# Patient Record
Sex: MALE | Race: BLACK OR AFRICAN AMERICAN | URBAN - METROPOLITAN AREA
[De-identification: names, ages, dates, MRNs, and addresses within clinical notes are randomized per-mention and may not be internally consistent; named-entity substitution may affect disease eponyms.]

---

## 2017-01-21 ENCOUNTER — ED HISTORICAL/CONVERTED ENCOUNTER (OUTPATIENT)
Dept: OTHER | Age: 67
End: 2017-01-21

## 2017-05-24 ENCOUNTER — OP HISTORICAL/CONVERTED ENCOUNTER (OUTPATIENT)
Dept: OTHER | Age: 67
End: 2017-05-24

## 2017-11-09 ENCOUNTER — OP HISTORICAL/CONVERTED ENCOUNTER (OUTPATIENT)
Dept: OTHER | Age: 67
End: 2017-11-09

## 2018-03-24 LAB
CREATININE, EXTERNAL: 1.28
LDL-C, EXTERNAL: 47
PSA, EXTERNAL: 9.5

## 2018-03-29 ENCOUNTER — OP HISTORICAL/CONVERTED ENCOUNTER (OUTPATIENT)
Dept: OTHER | Age: 68
End: 2018-03-29

## 2018-05-29 ENCOUNTER — OP HISTORICAL/CONVERTED ENCOUNTER (OUTPATIENT)
Dept: OTHER | Age: 68
End: 2018-05-29

## 2018-06-12 ENCOUNTER — OP HISTORICAL/CONVERTED ENCOUNTER (OUTPATIENT)
Dept: OTHER | Age: 68
End: 2018-06-12

## 2018-08-31 ENCOUNTER — OFFICE VISIT (OUTPATIENT)
Dept: ENDOCRINOLOGY | Age: 68
End: 2018-08-31

## 2018-08-31 VITALS
HEART RATE: 83 BPM | SYSTOLIC BLOOD PRESSURE: 151 MMHG | BODY MASS INDEX: 37.19 KG/M2 | TEMPERATURE: 97.9 F | RESPIRATION RATE: 18 BRPM | HEIGHT: 77 IN | OXYGEN SATURATION: 97 % | DIASTOLIC BLOOD PRESSURE: 98 MMHG | WEIGHT: 315 LBS

## 2018-08-31 DIAGNOSIS — I10 ESSENTIAL HYPERTENSION: ICD-10-CM

## 2018-08-31 DIAGNOSIS — E11.65 TYPE 2 DIABETES MELLITUS WITH HYPERGLYCEMIA, UNSPECIFIED WHETHER LONG TERM INSULIN USE (HCC): ICD-10-CM

## 2018-08-31 DIAGNOSIS — E78.2 MIXED HYPERLIPIDEMIA: ICD-10-CM

## 2018-08-31 DIAGNOSIS — C61 PROSTATE CANCER (HCC): ICD-10-CM

## 2018-08-31 DIAGNOSIS — E11.65 TYPE 2 DIABETES MELLITUS WITH HYPERGLYCEMIA, UNSPECIFIED WHETHER LONG TERM INSULIN USE (HCC): Primary | ICD-10-CM

## 2018-08-31 DIAGNOSIS — N18.4 STAGE 4 CHRONIC KIDNEY DISEASE (HCC): ICD-10-CM

## 2018-08-31 LAB
GLUCOSE POC: 307 MG/DL
HBA1C MFR BLD HPLC: 10.6 %

## 2018-08-31 RX ORDER — HYDRALAZINE HYDROCHLORIDE AND ISOSORBIDE DINITRATE 37.5; 2 MG/1; MG/1
TABLET, FILM COATED ORAL
COMMUNITY
Start: 2018-07-16

## 2018-08-31 RX ORDER — ASPIRIN 81 MG/1
TABLET ORAL DAILY
COMMUNITY

## 2018-08-31 RX ORDER — RIVAROXABAN 20 MG/1
TABLET, FILM COATED ORAL
COMMUNITY
Start: 2018-08-08

## 2018-08-31 RX ORDER — SIMVASTATIN 20 MG/1
TABLET, FILM COATED ORAL
COMMUNITY
Start: 2018-08-12 | End: 2019-03-06 | Stop reason: ALTCHOICE

## 2018-08-31 RX ORDER — PEN NEEDLE, DIABETIC 30 GX3/16"
NEEDLE, DISPOSABLE MISCELLANEOUS
Qty: 200 PEN NEEDLE | Refills: 6 | Status: SHIPPED | OUTPATIENT
Start: 2018-08-31 | End: 2019-09-12 | Stop reason: SDUPTHER

## 2018-08-31 RX ORDER — FUROSEMIDE 40 MG/1
TABLET ORAL
COMMUNITY
Start: 2018-06-30

## 2018-08-31 RX ORDER — GABAPENTIN 100 MG/1
CAPSULE ORAL
COMMUNITY
Start: 2018-08-04 | End: 2019-03-06 | Stop reason: DRUGHIGH

## 2018-08-31 RX ORDER — INSULIN GLARGINE 300 U/ML
130 INJECTION, SOLUTION SUBCUTANEOUS
COMMUNITY
Start: 2018-06-25 | End: 2018-08-31 | Stop reason: SDUPTHER

## 2018-08-31 RX ORDER — INSULIN LISPRO 100 [IU]/ML
INJECTION, SOLUTION INTRAVENOUS; SUBCUTANEOUS
Qty: 30 ML | Refills: 6
Start: 2018-08-31 | End: 2018-11-01 | Stop reason: SDUPTHER

## 2018-08-31 RX ORDER — SODIUM BICARBONATE 650 MG/1
TABLET ORAL 4 TIMES DAILY
COMMUNITY

## 2018-08-31 RX ORDER — TELMISARTAN 80 MG/1
TABLET ORAL
COMMUNITY
Start: 2018-08-07

## 2018-08-31 RX ORDER — METOPROLOL SUCCINATE 100 MG/1
TABLET, EXTENDED RELEASE ORAL
COMMUNITY
Start: 2018-08-20

## 2018-08-31 RX ORDER — SITAGLIPTIN 50 MG/1
TABLET, FILM COATED ORAL
COMMUNITY
Start: 2018-08-27

## 2018-08-31 RX ORDER — INSULIN LISPRO 100 [IU]/ML
5 INJECTION, SOLUTION INTRAVENOUS; SUBCUTANEOUS
COMMUNITY
Start: 2018-06-29 | End: 2018-08-31 | Stop reason: SDUPTHER

## 2018-08-31 NOTE — MR AVS SNAPSHOT
[address] [facility] [address] 
[phone] Patient: Thalia Shields MRN: [id number] Visit Information Date & Time Provider Department Dept. Phone Encounter #  
 8/31/2018  1:00 PM Jose Carlos Faustin MD [facility] [phone] [id number] Follow-up Instructions Return in about 2 months (around 10/31/2018). Upcoming Health Maintenance Date Due Hepatitis C Screening 1950 DTaP/Tdap/Td series (1 - Tdap) 10/20/1972 FOBT Q 1 YEAR AGE 50-75 10/20/2001 ZOSTER VACCINE AGE 60> 8/20/2011 GLAUCOMA SCREENING Q2Y 10/20/2016 Pneumococcal 65+ Low/Medium Risk (1 of 2 - PCV13) 10/20/2016 Influenza Age 5 to Adult 8/1/2018 Allergies as of 8/31/2018  Review Complete On: 8/31/2018 By: Jose Carlos Faustin MD  
 No Known Allergies Current Immunizations  Never Reviewed No immunizations on file. Not reviewed this visit You Were Diagnosed With   
  
 Codes Comments Type 2 diabetes mellitus with hyperglycemia, unspecified whether long term insulin use (HCC)    -  Primary ICD-10-CM: E11.65 ICD-9-CM: 250.00 Vitals BP Pulse Temp Resp Height(growth percentile) Weight(growth percentile) (!) 151/98 (BP 1 Location: Right arm, BP Patient Position: Sitting) 83 97.9 °F (36.6 °C) (Oral) 18 6' 5\" (1.956 m) 322 lb (146.1 kg) SpO2 BMI Smoking Status 97% 38.18 kg/m2 Never Smoker BMI and BSA Data Body Mass Index Body Surface Area  
 38.18 kg/m 2 2.82 m 2 Your Updated Medication List  
  
   
This list is accurate as of 8/31/18  1:37 PM.  Always use your most recent med list.  
  
  
  
  
 aspirin delayed-release 81 mg tablet Take  by mouth daily. BIDIL 20-37.5 mg per tablet Generic drug:  isosorbide-hydrALAZINE  
  
 furosemide 40 mg tablet Commonly known as:  LASIX  
  
 gabapentin 100 mg capsule Commonly known as:  NEURONTIN  
 HumaLOG KwikPen Insulin 100 unit/mL kwikpen Generic drug:  insulin lispro 5 Units Before breakfast, lunch, and dinner. JANUVIA 50 mg tablet Generic drug:  SITagliptin  
  
 loratadine 10 mg Cap Take  by mouth.  
  
 metoprolol succinate 100 mg tablet Commonly known as:  TOPROL-XL  
  
 simvastatin 20 mg tablet Commonly known as:  ZOCOR  
  
 sodium bicarbonate 650 mg tablet Take  by mouth four (4) times daily. telmisartan 80 mg tablet Commonly known as:  MICARDIS  
  
 TOUJEO SOLOSTAR U-300 INSULIN 300 unit/mL (1.5 mL) Inpn Generic drug:  insulin glargine 130 Units nightly. XARELTO 20 mg Tab tablet Generic drug:  rivaroxaban We Performed the Following AMB POC GLUCOSE BLOOD, BY GLUCOSE MONITORING DEVICE [20600 CPT(R)] AMB POC HEMOGLOBIN A1C [55678 CPT(R)] Follow-up Instructions Return in about 2 months (around 10/31/2018). Patient Instructions   
-------------------------------------------------------------------------------------------- Refills    -    please call your pharmacy and have them send us a refill request 
 
Results  -  allow up to a week for lab results to be processed and reviewed. Phone calls  -  Allow upto 24 hrs. for non-urgent calls to be retained Prior authorization - It may take up to 2 weeks to process, depending on your insurance Forms  -  FMLA, DMV, patient assistance, etc. will take up to 2 weeks to process Cancellations - please notify the office in advance if you cannot keep your appointment Samples  - will only be dispensed at visits as supply is limited If you are having a medical emergency call 911 
 
-------------------------------------------------------------------------------------------- Do not skip meals Do not eat in between meals Reduce carbs- pasta, rice, potatoes, bread Do not drink juices or sodas Donot eat peanut butter Do not eat sugar free cookies and cakes Do not eat peaches, oranges, pineapples, grapes, and raisins  
 
 
----------------------------------------------------------------------------------------------------------------- Check blood sugars immediately before each meal and at bedtime Take  TOUJEO insulin  100  units  at bed time Take Humalog  insulin 12   units before breakfast, 12  units before lunch and 12 units before dinner. Also, add additional humalog  as follows with meals  If blood sugars are[de-identified] 
 
150-200 mg 3 units 201-250 mg 6 units 251-300 mg 9 units 301-350 mg 12 units 351-400 mg 15 units 401-450 mg 18 units 451-500 mg 21 units Less than 70 mg NO INSULIN Introducing [facility]! [facility] introduces [employer] patient portal. Now you can access parts of your medical record, email your doctor's office, and request medication refills online. 1. In your internet browser, go to [email or web address] 2. Click on the First Time User? Click Here link in the Sign In box. You will see the New Member Sign Up page. 3. Enter your [employer] Access Code exactly as it appears below. You will not need to use this code after youve completed the sign-up process. If you do not sign up before the expiration date, you must request a new code. · [employer] Access Code: [email or web address] Expires: 11/29/2018  1:36 PM 
 
4. Enter the last four digits of your Social Security Number (xxxx) and Date of Birth (mm/dd/yyyy) as indicated and click Submit. You will be taken to the next sign-up page. 5. Create a [employer] ID. This will be your [employer] login ID and cannot be changed, so think of one that is secure and easy to remember. 6. Create a [employer] password. You can change your password at any time. 7. Enter your Password Reset Question and Answer. This can be used at a later time if you forget your password. 8. Enter your e-mail address. You will receive e-mail notification when new information is available in [address] 9. Click Sign Up. You can now view and download portions of your medical record. 10. Click the Download Summary menu link to download a portable copy of your medical information. If you have questions, please visit the Frequently Asked Questions section of the [employer] website. Remember, [employer] is NOT to be used for urgent needs. For medical emergencies, dial 911. Now available from your iPhone and Android! Please provide this summary of care documentation to your next provider. Your primary care clinician is listed as Moshe Ledesma. If you have any questions after today's visit, please call [phone].

## 2018-08-31 NOTE — PROGRESS NOTES
HISTORY OF PRESENT ILLNESS Vero Haddad is a 78 y.o. male. HPI Patient here for initial visit of Type 2 diabetes mellitus . Referred : by self H/o diabetes for many  years Accompanied by  Wife Current A1C is over 10 %  and symptoms/problems include polyuria, polydipsia and visual disturbances Current diabetic medications include intensive insulin injection program.  
HE DOES NOT EAT RIGHT, DOES NOT CHECK PER  WIFE  
 
 
APPEARS DEPRESSED  
YET TO GO FOR RADIATION TREATMENTS FOR PROSTRATE CANCER Current monitoring regimen: home blood tests - rarely Home blood sugar records: trend: fluctuating a lot Any episodes of hypoglycemia? no 
 
Weight trend: stable Prior visit with dietician: no 
Current diet: \"unhealthy\" diet in general 
Current exercise: no regular exercise Known diabetic complications: nephropathy and peripheral neuropathy Cardiovascular risk factors: dyslipidemia, diabetes mellitus, obesity, sedentary life style, male gender, hypertension, stress Eye exam current (within one year): yes ADAN: unknown Past Medical History:  
Diagnosis Date  [facility]fib ([facility]  [facility]  High cholesterol  Hypertension  Neuropathy History reviewed. No pertinent surgical history. Current Outpatient Prescriptions Medication Sig  furosemide (LASIX) 40 mg tablet  gabapentin (NEURONTIN) 100 mg capsule  BIDIL 20-37.5 mg per tablet  simvastatin (ZOCOR) 20 mg tablet  metoprolol succinate (TOPROL-XL) 100 mg tablet  telmisartan (MICARDIS) 80 mg tablet  JANUVIA 50 mg tablet  XARELTO 20 mg tab tablet  HUMALOG KWIKPEN INSULIN 100 unit/mL kwikpen 5 Units Before breakfast, lunch, and dinner.  TOUJEO SOLOSTAR U-300 INSULIN 300 unit/mL (1.5 mL) inpn 130 Units nightly.  aspirin delayed-release 81 mg tablet Take  by mouth daily.  loratadine 10 mg cap Take  by mouth.  sodium bicarbonate 650 mg tablet Take  by mouth four (4) times daily. No current facility-administered medications for this visit. Review of Systems Constitutional: Positive for malaise/fatigue. HENT: Negative. Eyes: Negative for pain and redness. Respiratory: Negative. Cardiovascular: Negative for chest pain, palpitations and leg swelling. Gastrointestinal: Negative. Negative for constipation. Genitourinary: Positive for frequency. Musculoskeletal: Negative for myalgias. Skin: Negative. Neurological: Negative. Endo/Heme/Allergies: Negative. Psychiatric/Behavioral: Negative for depression and memory loss. The patient does not have insomnia. Physical Exam  
Constitutional: He is oriented to person, place, and time. He appears well-developed and well-nourished. HENT:  
Head: Normocephalic. Eyes: Conjunctivae and EOM are normal. Pupils are equal, round, and reactive to light. Neck: Normal range of motion. Neck supple. No JVD present. No tracheal deviation present. No thyromegaly present. Cardiovascular: Normal rate, regular rhythm and normal heart sounds. Pulmonary/Chest: Effort normal and breath sounds normal.  
Abdominal: Soft. Bowel sounds are normal.  
Musculoskeletal: Normal range of motion. Lymphadenopathy:  
  He has no cervical adenopathy. Neurological: He is alert and oriented to person, place, and time. He has normal reflexes. Skin: Skin is warm. Psychiatric:  
APPEARS A BIT DEPRESSED Diabetic foot exam: aug 2018 Left Foot: 
 Visual Exam: normal   Bunion present , flat foot Pulse DP: 2+ (normal) Filament test: ABSENT Right Foot: 
 Visual Exam: normal  bunion present , flat foot Pulse DP: 2+ (normal) Filament test: ABSENT Reviewed info  From pcp ASSESSMENT and PLAN 1. Type 2 DM, poorly controlled : a1c is over 10 % Discussed DM 2 patho-physiology extensively Reviewed the glucose log He appears depressed from the diagnosis of prostrate cancer likely Encouraged him to give his best  
Adjusted basal bolus regimen Patient is advised about checking blood sugars 4 times a day and maintaining log book. The danger of having low blood sugars has been explained with inappropriate use of insulin  Patient voiced understanding and using the printed instructions at home. Hypoglycemia management has been explained to the patient. Asking patient to AVOID HIGH GI FOODS  
 
lab results and schedule of future lab studies reviewed with patient 
specific diabetic recommendations: diabetic diet discussed in detail, written exchange diet given, low cholesterol diet, weight control and daily exercise discussed, home glucose monitoring emphasized, glucose meter dispensed to patient, all medications, side effects and compliance discussed carefully, use and side effects of insulin is taught, foot care discussed and Podiatry visits discussed and annual eye examinations at Ophthalmology discussed 2. Hypoglycemia :  Educated on treating the hypoglycemia. Discussed INSULIN use and prescribed 3. HTN : continue current care. Patient is educated about importance of compliance with anti-hypertensives especially ARB/ACEI 4. Dyslipidemia : continue current meds. Patient is educated about benefits and adverse effects of statins and explained how benefits outweigh risk. 5. use of aspirin to prevent MI and TIA's discussed 6. H/o prostrate cancer - yet to go for radiotherapy 7. H/o afib F/u with Dr. Loren Schmitz 8. Peripheral neuropathy : MILD TO MODERATE SYMPTOMS 9. CKD  -   Creat is  2.12 and stage 3   FROM [facility] 2017, VERY OLD LABS  
F/U WITH DR. Merino Pap 
 
 
 
> 50 % of time is spent on counseling Patient voiced understanding her plan of care

## 2018-08-31 NOTE — PATIENT INSTRUCTIONS
-------------------------------------------------------------------------------------------- Refills    -    please call your pharmacy and have them send us a refill request 
 
Results  -  allow up to a week for lab results to be processed and reviewed. Phone calls  -  Allow upto 24 hrs. for non-urgent calls to be retained Prior authorization - It may take up to 2 weeks to process, depending on your insurance Forms  -  FMLA, DMV, patient assistance, etc. will take up to 2 weeks to process Cancellations - please notify the office in advance if you cannot keep your appointment Samples  - will only be dispensed at visits as supply is limited If you are having a medical emergency call 911 
 
-------------------------------------------------------------------------------------------- Do not skip meals Do not eat in between meals Reduce carbs- pasta, rice, potatoes, bread Do not drink juices or sodas Donot eat peanut butter Do not eat sugar free cookies and cakes Do not eat peaches, oranges, pineapples, grapes, and raisins  
 
 
----------------------------------------------------------------------------------------------------------------- Check blood sugars immediately before each meal and at bedtime Take  TOUJEO insulin  100  units  at bed time Take Humalog  insulin 12   units before breakfast, 12  units before lunch and 12 units before dinner. Also, add additional humalog  as follows with meals  If blood sugars are[de-identified] 
 
150-200 mg 3 units 201-250 mg 6 units 251-300 mg 9 units 301-350 mg 12 units 351-400 mg 15 units 401-450 mg 18 units 451-500 mg 21 units Less than 70 mg NO INSULIN

## 2018-08-31 NOTE — PROGRESS NOTES
1. Have you been to the ER, urgent care clinic since your last visit? No Hospitalized since your last visit? No 
 
2. Have you seen or consulted any other health care providers outside of the [facility] since your last visit? No 
 
Wt Readings from Last 3 Encounters:  
08/31/18 322 lb (146.1 kg) Temp Readings from Last 3 Encounters:  
08/31/18 97.9 °F (36.6 °C) (Oral) BP Readings from Last 3 Encounters:  
08/31/18 (!) 151/98 Pulse Readings from Last 3 Encounters:  
08/31/18 83 Patient does not have meter or logbook today.

## 2018-09-03 ENCOUNTER — DOCUMENTATION ONLY (OUTPATIENT)
Dept: ENDOCRINOLOGY | Age: 68
End: 2018-09-03

## 2018-09-03 NOTE — PROGRESS NOTES
COULD YOU PLEASE OBTAIN RECENT LABS ON THIS PT     LAST ONES WE GOT FROM PCP WERE FROM [facility] 2017     Nadeem Arshad MD

## 2018-09-04 ENCOUNTER — OP HISTORICAL/CONVERTED ENCOUNTER (OUTPATIENT)
Dept: OTHER | Age: 68
End: 2018-09-04

## 2018-09-07 ENCOUNTER — OP HISTORICAL/CONVERTED ENCOUNTER (OUTPATIENT)
Dept: OTHER | Age: 68
End: 2018-09-07

## 2018-09-12 ENCOUNTER — OP HISTORICAL/CONVERTED ENCOUNTER (OUTPATIENT)
Dept: OTHER | Age: 68
End: 2018-09-12

## 2018-09-21 ENCOUNTER — OP HISTORICAL/CONVERTED ENCOUNTER (OUTPATIENT)
Dept: OTHER | Age: 68
End: 2018-09-21

## 2018-10-01 ENCOUNTER — OP HISTORICAL/CONVERTED ENCOUNTER (OUTPATIENT)
Dept: OTHER | Age: 68
End: 2018-10-01

## 2018-11-01 ENCOUNTER — OFFICE VISIT (OUTPATIENT)
Dept: ENDOCRINOLOGY | Age: 68
End: 2018-11-01

## 2018-11-01 ENCOUNTER — OP HISTORICAL/CONVERTED ENCOUNTER (OUTPATIENT)
Dept: OTHER | Age: 68
End: 2018-11-01

## 2018-11-01 VITALS
SYSTOLIC BLOOD PRESSURE: 159 MMHG | DIASTOLIC BLOOD PRESSURE: 85 MMHG | RESPIRATION RATE: 16 BRPM | HEIGHT: 77 IN | OXYGEN SATURATION: 97 % | HEART RATE: 78 BPM | TEMPERATURE: 97.1 F | WEIGHT: 315 LBS | BODY MASS INDEX: 37.19 KG/M2

## 2018-11-01 DIAGNOSIS — C61 PROSTATE CANCER (HCC): ICD-10-CM

## 2018-11-01 DIAGNOSIS — E11.65 TYPE 2 DIABETES MELLITUS WITH HYPERGLYCEMIA, UNSPECIFIED WHETHER LONG TERM INSULIN USE (HCC): ICD-10-CM

## 2018-11-01 DIAGNOSIS — E11.65 TYPE 2 DIABETES MELLITUS WITH HYPERGLYCEMIA, UNSPECIFIED WHETHER LONG TERM INSULIN USE (HCC): Primary | ICD-10-CM

## 2018-11-01 DIAGNOSIS — I10 ESSENTIAL HYPERTENSION: ICD-10-CM

## 2018-11-01 DIAGNOSIS — E78.2 MIXED HYPERLIPIDEMIA: ICD-10-CM

## 2018-11-01 RX ORDER — INSULIN LISPRO 100 [IU]/ML
INJECTION, SOLUTION INTRAVENOUS; SUBCUTANEOUS
Qty: 93 ML | Refills: 6 | Status: SHIPPED | OUTPATIENT
Start: 2018-11-01 | End: 2019-11-22 | Stop reason: SDUPTHER

## 2018-11-01 RX ORDER — INSULIN LISPRO 100 [IU]/ML
INJECTION, SOLUTION INTRAVENOUS; SUBCUTANEOUS
Qty: 30 ML | Refills: 6 | Status: SHIPPED | OUTPATIENT
Start: 2018-11-01 | End: 2018-11-01 | Stop reason: SDUPTHER

## 2018-11-01 NOTE — PATIENT INSTRUCTIONS
-------------------------------------------------------------------------------------------- Refills    -    please call your pharmacy and have them send us a refill request 
 
Results  -  allow up to a week for lab results to be processed and reviewed. Phone calls  -  Allow upto 24 hrs. for non-urgent calls to be retained Prior authorization - It may take up to 4 weeks to process, depending on your insurance Forms  -  FMLA, DMV, patient assistance, etc. will take up to 2 weeks to process Cancellations - please notify the office in advance if you cannot keep your appointment Samples  - will only be dispensed at visits as supply is limited If you are having a medical emergency call 911 
 
-------------------------------------------------------------------------------------------- Do not skip meals Do not eat in between meals Reduce carbs- pasta, rice, potatoes, bread Do not drink juices or sodas Donot eat peanut butter Do not eat sugar free cookies and cakes Do not eat peaches, oranges, pineapples, grapes, and raisins  
 
 
----------------------------------------------------------------------------------------------------------------- 
 
 
STAY ON Rashi Winkler Check blood sugars immediately before each meal and at bedtime INCREASE   TOUJEO   Max   PENS   insulin  110  units  at bed time ( STOP TOUJEO PLAN ) Humalog  insulin 12   units before breakfast, 12  units before lunch and    INCREASE  15  units before dinner. Also, add additional humalog  as follows with meals  If blood sugars are[de-identified] 
 
150-200 mg 3 units 201-250 mg 6 units 251-300 mg 9 units 301-350 mg 12 units 351-400 mg 15 units 401-450 mg 18 units 451-500 mg 21 units Less than 70 mg NO INSULIN

## 2018-11-01 NOTE — PROGRESS NOTES
1. Have you been to the ER, urgent care clinic since your last visit? Hospitalized since your last visit? No 
 
2. Have you seen or consulted any other health care providers outside of the [address] [place] [address] since your last visit? Include any pap smears or colon screening. No  
 
Lab Results Component Value Date/Time Hemoglobin A1c 8.0 (H) 10/25/2018 08:27 AM  
 Hemoglobin A1c (POC) 10.6 08/31/2018 01:02 PM  
 Hemoglobin A1c, External 11.3 03/24/2018 Wt Readings from Last 3 Encounters:  
11/01/18 328 lb (148.8 kg) 08/31/18 322 lb (146.1 kg) Temp Readings from Last 3 Encounters:  
11/01/18 97.1 °F (36.2 °C) (Oral) 08/31/18 97.9 °F (36.6 °C) (Oral) BP Readings from Last 3 Encounters:  
11/01/18 159/85  
08/31/18 (!) 151/98 Pulse Readings from Last 3 Encounters:  
11/01/18 78  
08/31/18 83

## 2018-11-01 NOTE — PROGRESS NOTES
HISTORY OF PRESENT ILLNESS Memo Haider is a 80 y.o. male. Patient here for  FIRST FOLLOW UP AFTER initial visit  For  Type 2 diabetes mellitus  From aug 2018 He finds himself to be doing very well He is using diet correct and insulins correctly Accompanied by wife He got the log OLD HISTORY Referred : by self H/o diabetes for many  years Accompanied by  Wife Current A1C is over 10 %  and symptoms/problems include polyuria, polydipsia and visual disturbances Current diabetic medications include intensive insulin injection program.  
HE DOES NOT EAT RIGHT, DOES NOT CHECK PER  WIFE  
 
 
APPEARS DEPRESSED  
YET TO GO FOR RADIATION TREATMENTS FOR PROSTRATE CANCER Current monitoring regimen: home blood tests - rarely Home blood sugar records: trend: fluctuating a lot Any episodes of hypoglycemia? no 
 
Weight trend: stable Prior visit with dietician: no 
Current diet: \"unhealthy\" diet in general 
Current exercise: no regular exercise Known diabetic complications: nephropathy and peripheral neuropathy Cardiovascular risk factors: dyslipidemia, diabetes mellitus, obesity, sedentary life style, male gender, hypertension, stress Eye exam current (within one year): yes ADAN: unknown Past Medical History:  
Diagnosis Date  [facility]fib ([facility]  [facility]  High cholesterol  Hypertension  Neuropathy History reviewed. No pertinent surgical history. Current Outpatient Medications Medication Sig  furosemide (LASIX) 40 mg tablet  gabapentin (NEURONTIN) 100 mg capsule  BIDIL 20-37.5 mg per tablet  simvastatin (ZOCOR) 20 mg tablet  metoprolol succinate (TOPROL-XL) 100 mg tablet  telmisartan (MICARDIS) 80 mg tablet  JANUVIA 50 mg tablet  XARELTO 20 mg tab tablet  aspirin delayed-release 81 mg tablet Take  by mouth daily.  loratadine 10 mg cap Take  by mouth.  sodium bicarbonate 650 mg tablet Take  by mouth four (4) times daily.  insulin glargine (TOUJEO SOLOSTAR U-300 INSULIN) 300 unit/mL (1.5 mL) inpn Inject 100 units at bedtime  insulin lispro (HUMALOG KWIKPEN INSULIN) 100 unit/mL kwikpen Inject 12 units before breakfast, lunch, and dinner. Plus sliding scale to max 99 units daily.  Insulin Needles, Disposable, (BD ULTRA-FINE SHORT PEN NEEDLE) 31 gauge x 5/16\" ndle Use 4 times daily. Dx code E11.65 No current facility-administered medications for this visit. Review of Systems Constitutional: Positive for malaise/fatigue. HENT: Negative. Eyes: Negative for pain and redness. Respiratory: Negative. Cardiovascular: Negative for chest pain, palpitations and leg swelling. Gastrointestinal: Negative. Negative for constipation. Genitourinary: Positive for frequency. Musculoskeletal: Negative for myalgias. Skin: Negative. Neurological: Negative. Endo/Heme/Allergies: Negative. Psychiatric/Behavioral: Negative for depression and memory loss. The patient does not have insomnia. Physical Exam  
Constitutional: He is oriented to person, place, and time. He appears well-developed and well-nourished. HENT:  
Head: Normocephalic. Eyes: Conjunctivae and EOM are normal. Pupils are equal, round, and reactive to light. Neck: Normal range of motion. Neck supple. No JVD present. No tracheal deviation present. No thyromegaly present. Cardiovascular: Normal rate, regular rhythm and normal heart sounds. Pulmonary/Chest: Effort normal and breath sounds normal.  
Abdominal: Soft. Bowel sounds are normal.  
Musculoskeletal: Normal range of motion. Lymphadenopathy:  
  He has no cervical adenopathy. Neurological: He is alert and oriented to person, place, and time. He has normal reflexes. Skin: Skin is warm. Psychiatric:  
APPEARS A BIT DEPRESSED Diabetic foot exam: aug 2018 Left Foot: Visual Exam: normal   Bunion present , flat foot Pulse DP: 2+ (normal) Filament test: ABSENT Right Foot: 
 Visual Exam: normal  bunion present , flat foot Pulse DP: 2+ (normal) Filament test: ABSENT Lab Results Component Value Date/Time Hemoglobin A1c 8.0 (H) 10/25/2018 08:27 AM  
 Hemoglobin A1c, External 11.3 03/24/2018 Glucose 171 (H) 10/25/2018 08:27 AM  
 Glucose  08/31/2018 01:02 PM  
 Microalb/Creat ratio (ug/mg creat.) 36.2 (H) 10/25/2018 08:27 AM  
 LDL, calculated 36 10/25/2018 08:27 AM  
 Creatinine 1.21 10/25/2018 08:27 AM  
  
Lab Results Component Value Date/Time Cholesterol, total 107 10/25/2018 08:27 AM  
 HDL Cholesterol 32 (L) 10/25/2018 08:27 AM  
 LDL, calculated 36 10/25/2018 08:27 AM  
 LDL-C, External 47 03/24/2018 Triglyceride 196 (H) 10/25/2018 08:27 AM  
 
Lab Results Component Value Date/Time ALT (SGPT) 17 10/25/2018 08:27 AM  
 AST (SGOT) 17 10/25/2018 08:27 AM  
 Alk. phosphatase 88 10/25/2018 08:27 AM  
 Bilirubin, total 0.3 10/25/2018 08:27 AM  
 Albumin 3.8 10/25/2018 08:27 AM  
 Protein, total 7.2 10/25/2018 08:27 AM  
 
Lab Results Component Value Date/Time GFR est non-AA 62 10/25/2018 08:27 AM  
 GFR est AA 71 10/25/2018 08:27 AM  
 Creatinine 1.21 10/25/2018 08:27 AM  
 BUN 16 10/25/2018 08:27 AM  
 Sodium 144 10/25/2018 08:27 AM  
 Potassium 4.2 10/25/2018 08:27 AM  
 Chloride 102 10/25/2018 08:27 AM  
 CO2 29 10/25/2018 08:27 AM  
 
 
 
ASSESSMENT and PLAN 1. Type 2 DM, un controlled : a1c is   8 %      FROM OCT 2018      COMPARED TO  over 10 % IMPROVED CONTROL significantly Reviewed the glucose log He appeared depressed from the diagnosis of prostrate cancer likely  But today he looks brighter WILL CONSIDER METFOMRIN AT LATER TIMES AFTER TREATMENT IS FINISHED  
STAY ON Yarelis Mabry Encouraged him to give his best  
Adjusted basal bolus regimen Patient is advised about checking blood sugars 4 times a day and maintaining log book. The danger of having low blood sugars has been explained with inappropriate use of insulin  Patient voiced understanding and using the printed instructions at home. Hypoglycemia management has been explained to the patient. Asking patient to AVOID HIGH GI FOODS  
lab results and schedule of future lab studies reviewed with patient 2. Hypoglycemia :  Educated on treating the hypoglycemia. Discussed INSULIN use and prescribed 3. HTN : continue current care. Patient is educated about importance of compliance with anti-hypertensives especially ARB/ACEI 4. Dyslipidemia : continue current meds. Patient is educated about benefits and adverse effects of statins and explained how benefits outweigh risk. 5. use of aspirin to prevent MI and TIA's discussed 6. H/o prostrate cancer - started radiotherapy 7. H/o afib F/u with Dr. Alex Mata 8. Peripheral neuropathy : MILD TO MODERATE SYMPTOMS 9. CKD  -  RESOLVED  Creat is  NORMAL ( HE CAM BE ON METFORMIN , BUT NOT STARTING IT BECAUSE OF RADIATION TREATMENTS ) 
 
F/U WITH DR. Bart King 
 
 
 
> 50 % of time is spent on counseling Patient voiced understanding her plan of care

## 2018-12-01 ENCOUNTER — OP HISTORICAL/CONVERTED ENCOUNTER (OUTPATIENT)
Dept: OTHER | Age: 68
End: 2018-12-01

## 2019-02-12 ENCOUNTER — ED HISTORICAL/CONVERTED ENCOUNTER (OUTPATIENT)
Dept: OTHER | Age: 69
End: 2019-02-12

## 2019-03-06 ENCOUNTER — OFFICE VISIT (OUTPATIENT)
Dept: ENDOCRINOLOGY | Age: 69
End: 2019-03-06

## 2019-03-06 VITALS
BODY MASS INDEX: 37.19 KG/M2 | HEIGHT: 77 IN | OXYGEN SATURATION: 99 % | HEART RATE: 67 BPM | SYSTOLIC BLOOD PRESSURE: 129 MMHG | TEMPERATURE: 95.5 F | DIASTOLIC BLOOD PRESSURE: 82 MMHG | RESPIRATION RATE: 20 BRPM | WEIGHT: 315 LBS

## 2019-03-06 DIAGNOSIS — C61 PROSTATE CANCER (HCC): ICD-10-CM

## 2019-03-06 DIAGNOSIS — N18.30 CKD STAGE 3 SECONDARY TO DIABETES (HCC): ICD-10-CM

## 2019-03-06 DIAGNOSIS — E11.22 CKD STAGE 3 SECONDARY TO DIABETES (HCC): ICD-10-CM

## 2019-03-06 DIAGNOSIS — E11.65 TYPE 2 DIABETES MELLITUS WITH HYPERGLYCEMIA, UNSPECIFIED WHETHER LONG TERM INSULIN USE (HCC): Primary | ICD-10-CM

## 2019-03-06 DIAGNOSIS — I10 ESSENTIAL HYPERTENSION: ICD-10-CM

## 2019-03-06 DIAGNOSIS — E11.65 TYPE 2 DIABETES MELLITUS WITH HYPERGLYCEMIA, UNSPECIFIED WHETHER LONG TERM INSULIN USE (HCC): ICD-10-CM

## 2019-03-06 PROBLEM — E66.01 SEVERE OBESITY (HCC): Status: ACTIVE | Noted: 2019-03-06

## 2019-03-06 RX ORDER — TAMSULOSIN HYDROCHLORIDE 0.4 MG/1
CAPSULE ORAL
Refills: 1 | COMMUNITY
Start: 2019-02-11

## 2019-03-06 RX ORDER — GABAPENTIN 300 MG/1
CAPSULE ORAL
Qty: 90 CAP | Refills: 5 | Status: SHIPPED | OUTPATIENT
Start: 2019-03-06 | End: 2019-09-06 | Stop reason: SDUPTHER

## 2019-03-06 NOTE — PATIENT INSTRUCTIONS
--------------------------------------------------------------------------------------------    Refills    -    please call your pharmacy and have them send us a refill request    Results  -  allow up to a week for lab results to be processed and reviewed. Phone calls  -  Allow upto 24 hrs. for non-urgent calls to be retained    Prior authorization - It may take up to 4 weeks to process, depending on your insurance    Forms  -  FMLA, DMV, patient assistance, etc. will take up to 2 weeks to process    Cancellations - please notify the office in advance if you cannot keep your appointment    Samples  - will only be dispensed at visits as supply is limited      If you are having a medical emergency call 911    --------------------------------------------------------------------------------------------      Do not skip meals  Do not eat in between meals    Reduce carbs- pasta, rice, potatoes, bread   Do not drink juices or sodas  Donot eat peanut butter     Do not eat sugar free cookies and cakes   Do not eat peaches, oranges, pineapples, grapes, and raisins       -----------------------------------------------------------------------------------------------------------------    STOP simvastatin     Increase neurontin  300 mg  Bid and increase to    600 mg at night time   ( 300 mg 2 pills ) , stop 100 mg dose       STAY ON JANUVIA       Check blood sugars immediately before each meal and at bedtime        TOUJEO   Max   PENS   insulin  110  units  at bed time ( STOP TOUJEO PLAN )     Humalog  insulin 12   units before breakfast, 12  units before lunch and    INCREASE  15  units before dinner.     Also, add additional humalog  as follows with meals  If blood sugars are[de-identified]    150-200 mg 3 units    201-250 mg 6 units    251-300 mg 9 units    301-350 mg 12 units    351-400 mg 15 units    401-450 mg 18 units    451-500 mg 21 units     Less than 70 mg NO INSULIN

## 2019-03-06 NOTE — PROGRESS NOTES
HISTORY OF PRESENT ILLNESS  Bernardo Caceres is a 80 y.o. male. Patient here for  FOLLOW UP AFTER last  visit  For  Type 2 diabetes mellitus  From November 2018       He developed acute pain on left leg   Went to ER and got onto prednisone and oxycodone   eval was negative     He still has pain in mid shin on left side         Old hsitory     He finds himself to be doing very well   He is using diet correct and insulins correctly     Accompanied by wife   He got the log      OLD HISTORY   Referred : by self    H/o diabetes for many  years     Accompanied by  Wife     Current A1C is over 10 %  and symptoms/problems include polyuria, polydipsia and visual disturbances     Current diabetic medications include intensive insulin injection program.   HE DOES NOT EAT RIGHT, DOES NOT CHECK PER  WIFE       APPEARS DEPRESSED   YET TO GO FOR RADIATION TREATMENTS FOR PROSTRATE CANCER      Current monitoring regimen: home blood tests - rarely  Home blood sugar records: trend: fluctuating a lot  Any episodes of hypoglycemia? no    Weight trend: stable  Prior visit with dietician: no  Current diet: \"unhealthy\" diet in general  Current exercise: no regular exercise    Known diabetic complications: nephropathy and peripheral neuropathy  Cardiovascular risk factors: dyslipidemia, diabetes mellitus, obesity, sedentary life style, male gender, hypertension, stress    Eye exam current (within one year): yes  ADAN: unknown     Past Medical History:   Diagnosis Date    [facility] [address]     Diabetes ([facility] [address]     High cholesterol     Hypertension     Neuropathy      History reviewed. No pertinent surgical history. Current Outpatient Medications   Medication Sig    tamsulosin (FLOMAX) 0.4 mg capsule TK 1 C PO QHS    insulin glargine U-300 conc (TOUJEO MAX U-300 SOLOSTAR) 300 unit/mL (3 mL) inpn 110 Units by SubCUTAneous route nightly.  Stop plain toujeo    insulin lispro (HUMALOG KWIKPEN INSULIN) 100 unit/mL kwikpen INJECT 12 UNITS BEFORE BREAKFAST AND LUNCH, AND 15 UNITS BEFORE DINNER. PLUS SLIDING SCALE TO  UNITS DAILY    furosemide (LASIX) 40 mg tablet     gabapentin (NEURONTIN) 100 mg capsule     BIDIL 20-37.5 mg per tablet     simvastatin (ZOCOR) 20 mg tablet     metoprolol succinate (TOPROL-XL) 100 mg tablet     telmisartan (MICARDIS) 80 mg tablet     JANUVIA 50 mg tablet     XARELTO 20 mg tab tablet     aspirin delayed-release 81 mg tablet Take  by mouth daily.  loratadine 10 mg cap Take  by mouth.  sodium bicarbonate 650 mg tablet Take  by mouth four (4) times daily.  Insulin Needles, Disposable, (BD ULTRA-FINE SHORT PEN NEEDLE) 31 gauge x 5/16\" ndle Use 4 times daily. Dx code E11.65     No current facility-administered medications for this visit. Review of Systems   Constitutional: Positive for malaise/fatigue. HENT: Negative. Eyes: Negative for pain and redness. Respiratory: Negative. Cardiovascular: Negative for chest pain, palpitations and leg swelling. Gastrointestinal: Negative. Negative for constipation. Genitourinary: Positive for frequency. Musculoskeletal: Negative for myalgias. Skin: Negative. Neurological: Negative. Endo/Heme/Allergies: Negative. Psychiatric/Behavioral: Negative for depression and memory loss. The patient does not have insomnia. Physical Exam   Constitutional: He is oriented to person, place, and time. He appears well-developed and well-nourished. HENT:   Head: Normocephalic. Eyes: Conjunctivae and EOM are normal. Pupils are equal, round, and reactive to light. Neck: Normal range of motion. Neck supple. No JVD present. No tracheal deviation present. No thyromegaly present. Cardiovascular: Normal rate, regular rhythm and normal heart sounds. Pulmonary/Chest: Effort normal and breath sounds normal.   Abdominal: Soft. Bowel sounds are normal.   Musculoskeletal: left leg not tender   Lymphadenopathy:     He has no cervical adenopathy. Neurological: He is alert and oriented to person, place, and time. He has normal reflexes. Skin: Skin is warm. Psychiatric: not depressed       Diabetic foot exam: aug 2018     Left Foot:   Visual Exam: normal   Bunion present , flat foot    Pulse DP: 2+ (normal)   Filament test: ABSENT          Right Foot:   Visual Exam: normal  bunion present , flat foot    Pulse DP: 2+ (normal)   Filament test: ABSENT         Lab Results   Component Value Date/Time    Hemoglobin A1c 7.9 (H) 02/26/2019 08:22 AM    Hemoglobin A1c 8.0 (H) 10/25/2018 08:27 AM    Hemoglobin A1c, External 11.3 03/24/2018    Glucose 178 (H) 02/26/2019 08:22 AM    Glucose  08/31/2018 01:02 PM    Microalb/Creat ratio (ug/mg creat.) 24.6 02/26/2019 08:22 AM    LDL, calculated 44 02/26/2019 08:22 AM    Creatinine 1.56 (H) 02/26/2019 08:22 AM      Lab Results   Component Value Date/Time    Cholesterol, total 112 02/26/2019 08:22 AM    HDL Cholesterol 29 (L) 02/26/2019 08:22 AM    LDL, calculated 44 02/26/2019 08:22 AM    LDL-C, External 47 03/24/2018    Triglyceride 194 (H) 02/26/2019 08:22 AM     Lab Results   Component Value Date/Time    ALT (SGPT) 9 02/26/2019 08:22 AM    AST (SGOT) 12 02/26/2019 08:22 AM    Alk. phosphatase 95 02/26/2019 08:22 AM    Bilirubin, total 0.4 02/26/2019 08:22 AM    Albumin 3.7 02/26/2019 08:22 AM    Protein, total 7.4 02/26/2019 08:22 AM     Lab Results   Component Value Date/Time    GFR est non-AA 45 (L) 02/26/2019 08:22 AM    GFR est AA 52 (L) 02/26/2019 08:22 AM    Creatinine 1.56 (H) 02/26/2019 08:22 AM    BUN 21 02/26/2019 08:22 AM    Sodium 146 (H) 02/26/2019 08:22 AM    Potassium 4.4 02/26/2019 08:22 AM    Chloride 100 02/26/2019 08:22 AM    CO2 28 02/26/2019 08:22 AM         ASSESSMENT and PLAN    1.  Type 2 DM, un controlled : a1c is   7.9 %     From   Feb 2019     Compared to   8 %      FROM OCT 2018      COMPARED TO  over 10 %     No change in CONTROL significantly   Reviewed the glucose log   Sugars are high from steroid use for left leg pain     WILL CONSIDER METFOMRIN AT LATER TIMES AFTER TREATMENT IS FINISHED   STAY ON [address] on basal bolus regimen     Patient is advised about checking blood sugars 4 times a day and maintaining log book. The danger of having low blood sugars has been explained with inappropriate use of insulin  Patient voiced understanding and using the printed instructions at home. Hypoglycemia management has been explained to the patient. Asking patient to AVOID HIGH GI FOODS   lab results and schedule of future lab studies reviewed with patient        2. Hypoglycemia :  Educated on treating the hypoglycemia. Discussed INSULIN use and prescribed    3. HTN : continue current care. Patient is educated about importance of compliance with anti-hypertensives especially ARB/ACEI    4. Dyslipidemia : continue current meds. Patient is educated about benefits and adverse effects of statins and explained how benefits outweigh risk. 5. use of aspirin to prevent MI and TIA's discussed      6. H/o prostrate cancer - started radiotherapy       7. Sudden onset pain in left leg 10/10 -  Etiology unclear   He cannot walk per wife   Increasing neurontin to higher doses       7. H/o afib  On xarelto   F/u with Dr. Raysa Cabezas       8. Peripheral neuropathy : MILD TO MODERATE SYMPTOMS       9.  CKD  -  RESOLVED  Creat is  NORMAL ( HE CAn BE ON METFORMIN , BUT NOT STARTING IT BECAUSE OF RADIATION TREATMENTS )  F/U WITH DR. Clif Fragoso        > 50 % of time is spent on counseling   Patient voiced understanding her plan of care

## 2019-03-06 NOTE — PROGRESS NOTES
1. Have you been to the ER, urgent care clinic since your last visit? [facility]/Left leg pain/February 2019  Hospitalized since your last visit? No    2. Have you seen or consulted any other health care providers outside of the [address] [place] [address] since your last visit? Include any pap smears or colon screening.  No     Wt Readings from Last 3 Encounters:   03/06/19 327 lb 9.6 oz (148.6 kg)   11/01/18 328 lb (148.8 kg)   08/31/18 322 lb (146.1 kg)     Temp Readings from Last 3 Encounters:   03/06/19 95.5 °F (35.3 °C) (Oral)   11/01/18 97.1 °F (36.2 °C) (Oral)   08/31/18 97.9 °F (36.6 °C) (Oral)     BP Readings from Last 3 Encounters:   03/06/19 129/82   11/01/18 159/85   08/31/18 (!) 151/98     Pulse Readings from Last 3 Encounters:   03/06/19 67   11/01/18 78   08/31/18 83     Lab Results   Component Value Date/Time    Hemoglobin A1c 7.9 (H) 02/26/2019 08:22 AM    Hemoglobin A1c (POC) 10.6 08/31/2018 01:02 PM    Hemoglobin A1c, External 11.3 03/24/2018     Patient has meter today

## 2019-04-30 DIAGNOSIS — E11.22 CKD STAGE 3 SECONDARY TO DIABETES (HCC): ICD-10-CM

## 2019-04-30 DIAGNOSIS — E11.65 TYPE 2 DIABETES MELLITUS WITH HYPERGLYCEMIA, UNSPECIFIED WHETHER LONG TERM INSULIN USE (HCC): ICD-10-CM

## 2019-04-30 DIAGNOSIS — I10 ESSENTIAL HYPERTENSION: ICD-10-CM

## 2019-04-30 DIAGNOSIS — N18.30 CKD STAGE 3 SECONDARY TO DIABETES (HCC): ICD-10-CM

## 2019-05-01 LAB
ALBUMIN SERPL-MCNC: 3.8 G/DL (ref 3.6–4.8)
ALBUMIN/CREAT UR: 22 MG/G CREAT (ref 0–30)
ALBUMIN/GLOB SERPL: 0.9 {RATIO} (ref 1.2–2.2)
ALP SERPL-CCNC: 120 IU/L (ref 39–117)
ALT SERPL-CCNC: 24 IU/L (ref 0–44)
AST SERPL-CCNC: 25 IU/L (ref 0–40)
BILIRUB SERPL-MCNC: 0.4 MG/DL (ref 0–1.2)
BUN SERPL-MCNC: 39 MG/DL (ref 8–27)
BUN/CREAT SERPL: 22 (ref 10–24)
CALCIUM SERPL-MCNC: 9.8 MG/DL (ref 8.6–10.2)
CHLORIDE SERPL-SCNC: 98 MMOL/L (ref 96–106)
CHOLEST SERPL-MCNC: 149 MG/DL (ref 100–199)
CO2 SERPL-SCNC: 30 MMOL/L (ref 20–29)
CREAT SERPL-MCNC: 1.81 MG/DL (ref 0.76–1.27)
CREAT UR-MCNC: 135.7 MG/DL
EST. AVERAGE GLUCOSE BLD GHB EST-MCNC: 171 MG/DL
GLOBULIN SER CALC-MCNC: 4.2 G/DL (ref 1.5–4.5)
GLUCOSE SERPL-MCNC: 110 MG/DL (ref 65–99)
HBA1C MFR BLD: 7.6 % (ref 4.8–5.6)
HDLC SERPL-MCNC: 27 MG/DL
INTERPRETATION, 910389: NORMAL
INTERPRETATION: NORMAL
LDLC SERPL CALC-MCNC: 92 MG/DL (ref 0–99)
Lab: NORMAL
MICROALBUMIN UR-MCNC: 29.9 UG/ML
PDF IMAGE, 910387: NORMAL
POTASSIUM SERPL-SCNC: 5 MMOL/L (ref 3.5–5.2)
PROT SERPL-MCNC: 8 G/DL (ref 6–8.5)
SODIUM SERPL-SCNC: 144 MMOL/L (ref 134–144)
TRIGL SERPL-MCNC: 151 MG/DL (ref 0–149)
VLDLC SERPL CALC-MCNC: 30 MG/DL (ref 5–40)

## 2019-05-07 ENCOUNTER — OFFICE VISIT (OUTPATIENT)
Dept: ENDOCRINOLOGY | Age: 69
End: 2019-05-07

## 2019-05-07 VITALS
DIASTOLIC BLOOD PRESSURE: 65 MMHG | WEIGHT: 315 LBS | SYSTOLIC BLOOD PRESSURE: 110 MMHG | RESPIRATION RATE: 18 BRPM | TEMPERATURE: 97.1 F | BODY MASS INDEX: 37.19 KG/M2 | OXYGEN SATURATION: 98 % | HEIGHT: 77 IN | HEART RATE: 80 BPM

## 2019-05-07 DIAGNOSIS — E11.22 CKD STAGE 3 SECONDARY TO DIABETES (HCC): ICD-10-CM

## 2019-05-07 DIAGNOSIS — C61 PROSTATE CANCER (HCC): ICD-10-CM

## 2019-05-07 DIAGNOSIS — E11.65 TYPE 2 DIABETES MELLITUS WITH HYPERGLYCEMIA, UNSPECIFIED WHETHER LONG TERM INSULIN USE (HCC): Primary | ICD-10-CM

## 2019-05-07 DIAGNOSIS — M79.605 LEG PAIN, ANTERIOR, LEFT: ICD-10-CM

## 2019-05-07 DIAGNOSIS — I10 ESSENTIAL HYPERTENSION: ICD-10-CM

## 2019-05-07 DIAGNOSIS — N18.30 CKD STAGE 3 SECONDARY TO DIABETES (HCC): ICD-10-CM

## 2019-05-07 RX ORDER — PRAVASTATIN SODIUM 20 MG/1
20 TABLET ORAL
Qty: 30 TAB | Refills: 6 | Status: SHIPPED | OUTPATIENT
Start: 2019-05-07 | End: 2019-12-23

## 2019-05-07 NOTE — PATIENT INSTRUCTIONS
-------------------------------------------------------------------------------------------- Refills    -    please call your pharmacy and have them send us a refill request 
 
Results  -  allow up to a week for lab results to be processed and reviewed. Phone calls  -  Allow upto 24 hrs. for non-urgent calls to be retained Prior authorization - It may take up to 4 weeks to process, depending on your insurance Forms  -  FMLA, DMV, patient assistance, etc. will take up to 2 weeks to process Cancellations - please notify the office in advance if you cannot keep your appointment Samples  - will only be dispensed at visits as supply is limited If you are having a medical emergency call 911 
 
-------------------------------------------------------------------------------------------- Do not skip meals Do not eat in between meals Reduce carbs- pasta, rice, potatoes, bread Do not drink juices or sodas Donot eat peanut butter Do not eat sugar free cookies and cakes Do not eat peaches, oranges, pineapples, grapes, and raisins  
 
 
----------------------------------------------------------------------------------------------------------------- 
 
 neurontin  300 mg  Bid and increase to    600 mg at night time   ( 300 mg 2 pills ) , stop 100 mg dose STAY ON [facility] Check blood sugars immediately before each meal and at bedtime TOUJEO   Max   PENS   insulin  110  units  at bed time ( STOP TOUJEO PLAN ) Humalog  insulin 12   units before breakfast, 12  units before lunch and    INCREASE  15  units before dinner. Also, add additional humalog  as follows with meals  If blood sugars are[de-identified] 
 
150-200 mg 3 units 201-250 mg 6 units 251-300 mg 9 units 301-350 mg 12 units 351-400 mg 15 units 401-450 mg 18 units 451-500 mg 21 units Less than 70 mg NO INSULIN

## 2019-05-07 NOTE — PROGRESS NOTES
HISTORY OF PRESENT ILLNESS Emma Bello is a 80 y.o. male. Patient here for  FOLLOW UP AFTER last  visit  For  Type 2 diabetes mellitus  From march 2019 He continues to have pain in left leg His pcp gave tylenol -codeine Pt says he cannot even walk SUGARS ARE BETTER ON LOG Old history : 
 
 
He developed acute pain on left leg Went to ER and got onto prednisone and oxycodone  
eval was negative He still has pain in mid shin on left side Old hsitory He finds himself to be doing very well He is using diet correct and insulins correctly Accompanied by wife He got the log OLD HISTORY Referred : by self H/o diabetes for many  years Accompanied by  Wife Current A1C is over 10 %  and symptoms/problems include polyuria, polydipsia and visual disturbances Current diabetic medications include intensive insulin injection program.  
HE DOES NOT EAT RIGHT, DOES NOT CHECK PER  WIFE  
 
 
APPEARS DEPRESSED  
YET TO GO FOR RADIATION TREATMENTS FOR PROSTRATE CANCER Current monitoring regimen: home blood tests - rarely Home blood sugar records: trend: fluctuating a lot Any episodes of hypoglycemia? no 
 
Weight trend: stable Prior visit with dietician: no 
Current diet: \"unhealthy\" diet in general 
Current exercise: no regular exercise Known diabetic complications: nephropathy and peripheral neuropathy Cardiovascular risk factors: dyslipidemia, diabetes mellitus, obesity, sedentary life style, male gender, hypertension, stress Eye exam current (within one year): yes ADAN: unknown Past Medical History:  
Diagnosis Date  A-fib ([facility] [address]  Diabetes ([facility] [address]  High cholesterol  Hypertension  Neuropathy History reviewed. No pertinent surgical history. Current Outpatient Medications Medication Sig  tamsulosin (FLOMAX) 0.4 mg capsule TK 1 C PO QHS  gabapentin (NEURONTIN) 300 mg capsule One capsule in the morning, two capsules at night. Stop 100 mg dose  insulin glargine U-300 conc (TOUJEO MAX U-300 SOLOSTAR) 300 unit/mL (3 mL) inpn 110 Units by SubCUTAneous route nightly. Stop plain toujeo  insulin lispro (HUMALOG KWIKPEN INSULIN) 100 unit/mL kwikpen INJECT 12 UNITS BEFORE BREAKFAST AND LUNCH, AND 15 UNITS BEFORE DINNER. PLUS SLIDING SCALE TO  UNITS DAILY  furosemide (LASIX) 40 mg tablet  BIDIL 20-37.5 mg per tablet  metoprolol succinate (TOPROL-XL) 100 mg tablet  telmisartan (MICARDIS) 80 mg tablet  JANUVIA 50 mg tablet  XARELTO 20 mg tab tablet  aspirin delayed-release 81 mg tablet Take  by mouth daily.  loratadine 10 mg cap Take  by mouth.  sodium bicarbonate 650 mg tablet Take  by mouth four (4) times daily.  Insulin Needles, Disposable, (BD ULTRA-FINE SHORT PEN NEEDLE) 31 gauge x 5/16\" ndle Use 4 times daily. Dx code E11.65 No current facility-administered medications for this visit. Review of Systems Constitutional: Positive for malaise/fatigue. HENT: Negative. Eyes: Negative for pain and redness. Respiratory: Negative. Cardiovascular: Negative for chest pain, palpitations and leg swelling. Gastrointestinal: Negative. Negative for constipation. Genitourinary: Positive for frequency. Musculoskeletal: Negative for myalgias. Skin: Negative. Neurological: Negative. Endo/Heme/Allergies: Negative. Psychiatric/Behavioral: Negative for depression and memory loss. The patient does not have insomnia. Physical Exam  
Constitutional: He is oriented to person, place, and time. He appears well-developed and well-nourished. HENT:  
Head: Normocephalic. Eyes: Conjunctivae and EOM are normal. Pupils are equal, round, and reactive to light. Neck: Normal range of motion. Neck supple. No JVD present. No tracheal deviation present. No thyromegaly present. Cardiovascular: Normal rate, regular rhythm and normal heart sounds. Pulmonary/Chest: Effort normal and breath sounds normal.  
Abdominal: Soft. Bowel sounds are normal.  
Musculoskeletal: left leg not tender Lymphadenopathy:  
  He has no cervical adenopathy. Neurological: He is alert and oriented to person, place, and time. He has normal reflexes. Skin: Skin is warm. Psychiatric: not depressed Diabetic foot exam: aug 2018 Left Foot: 
 Visual Exam: normal   Bunion present , flat foot Pulse DP: 2+ (normal) Filament test: ABSENT Right Foot: 
 Visual Exam: normal  bunion present , flat foot Pulse DP: 2+ (normal) Filament test: ABSENT Lab Results Component Value Date/Time Hemoglobin A1c 7.6 (H) 04/30/2019 10:28 AM  
 Hemoglobin A1c 7.9 (H) 02/26/2019 08:22 AM  
 Hemoglobin A1c 8.0 (H) 10/25/2018 08:27 AM  
 Hemoglobin A1c, External 11.3 03/24/2018 Glucose 110 (H) 04/30/2019 10:28 AM  
 Glucose  08/31/2018 01:02 PM  
 Microalb/Creat ratio (ug/mg creat.) 22.0 04/30/2019 10:28 AM  
 LDL, calculated 92 04/30/2019 10:28 AM  
 Creatinine 1.81 (H) 04/30/2019 10:28 AM  
  
Lab Results Component Value Date/Time Cholesterol, total 149 04/30/2019 10:28 AM  
 HDL Cholesterol 27 (L) 04/30/2019 10:28 AM  
 LDL, calculated 92 04/30/2019 10:28 AM  
 LDL-C, External 47 03/24/2018 Triglyceride 151 (H) 04/30/2019 10:28 AM  
 
Lab Results Component Value Date/Time ALT (SGPT) 24 04/30/2019 10:28 AM  
 AST (SGOT) 25 04/30/2019 10:28 AM  
 Alk. phosphatase 120 (H) 04/30/2019 10:28 AM  
 Bilirubin, total 0.4 04/30/2019 10:28 AM  
 Albumin 3.8 04/30/2019 10:28 AM  
 Protein, total 8.0 04/30/2019 10:28 AM  
 
Lab Results Component Value Date/Time  GFR est non-AA 38 (L) 04/30/2019 10:28 AM  
 GFR est AA 44 (L) 04/30/2019 10:28 AM  
 Creatinine 1.81 (H) 04/30/2019 10:28 AM  
 BUN 39 (H) 04/30/2019 10:28 AM  
 Sodium 144 04/30/2019 10:28 AM  
 Potassium 5.0 04/30/2019 10:28 AM  
 Chloride 98 04/30/2019 10:28 AM  
 CO2 30 (H) 04/30/2019 10:28 AM  
 
 
 
ASSESSMENT and PLAN 1. Type 2 DM, un controlled : a1c is  7.6 %       From april 30 2019    Compared to  7.9 %     From   Feb 2019     Compared to   8 %      FROM OCT 2018      COMPARED TO  over 10 % No change in CONTROL significantly Reviewed the glucose log Sugars are high from steroid use for left leg pain WILL CONSIDER METFOMRIN AT LATER TIMES AFTER TREATMENT IS FINISHED  
STAY ON [facility] Continue on basal bolus regimen Patient is advised about checking blood sugars 4 times a day and maintaining log book. The danger of having low blood sugars has been explained with inappropriate use of insulin  Patient voiced understanding and using the printed instructions at home. Hypoglycemia management has been explained to the patient. Asking patient to AVOID HIGH GI FOODS  
lab results and schedule of future lab studies reviewed with patient 2. Hypoglycemia :  Educated on treating the hypoglycemia. Discussed INSULIN use and prescribed 3. HTN : continue telmisartan . Patient is educated about importance of compliance with anti-hypertensives especially ARB/ACEI 4. Dyslipidemia : continue current meds. Patient is educated about benefits and adverse effects of statins and explained how benefits outweigh risk. 5. use of aspirin to prevent MI and TIA's discussed 6. H/o prostrate cancer - started radiotherapy 7. Sudden onset pain in left leg 10/10 -  Etiology unclear He cannot walk per wife AT TIMES Increased neurontin to higher doses LAST VISIT  
HE WAS TOLD THAT THERE IS SPINAL AFFECTION FROM PROSTRATE CANCER  
MRI likely IS NEEDED AND PT IS BEING TOLD TO F/U WITH RADIATION ONC 8. CKD -  Stage 3 ckd   
worser ( lasix 40 mg ) ADJUSTING FOR 20 MG OF LASIX DAILY  
F/U WITH DR. Irina Mcdonald 
 
 
7. H/o afib On xarelto F/u with Dr. Kalyan Yanez 8. Peripheral neuropathy : MILD TO MODERATE SYMPTOMS > 50 % of time is spent on counseling Patient voiced understanding her plan of care

## 2019-05-07 NOTE — PROGRESS NOTES
1. Have you been to the ER, urgent care clinic since your last visit? Hospitalized since your last visit? No 
 
2. Have you seen or consulted any other health care providers outside of the [address] [place] [address] since your last visit? Include any pap smears or colon screening. No 
Chief Complaint Patient presents with  Diabetes  
  followup Abuse Screening Questionnaire 5/7/2019 Do you ever feel afraid of your partner? Severiano Organ Are you in a relationship with someone who physically or mentally threatens you? Severiano Organ Is it safe for you to go home? Darrell Herrmann Learning assessment complete Wt Readings from Last 3 Encounters:  
05/07/19 324 lb (147 kg) 03/06/19 327 lb 9.6 oz (148.6 kg) 11/01/18 328 lb (148.8 kg) Temp Readings from Last 3 Encounters:  
05/07/19 97.1 °F (36.2 °C) (Oral) 03/06/19 95.5 °F (35.3 °C) (Oral) 11/01/18 97.1 °F (36.2 °C) (Oral) BP Readings from Last 3 Encounters:  
05/07/19 110/65  
03/06/19 129/82  
11/01/18 159/85 Pulse Readings from Last 3 Encounters:  
05/07/19 80  
03/06/19 67  
11/01/18 78 Lab Results Component Value Date/Time Hemoglobin A1c 7.6 (H) 04/30/2019 10:28 AM  
 Hemoglobin A1c (POC) 10.6 08/31/2018 01:02 PM  
 Hemoglobin A1c, External 11.3 03/24/2018

## 2019-05-07 NOTE — Clinical Note
5/7/19 Patient: Hortencia Marie YOB: 1950 Date of Visit: 5/7/2019 Angelica Bautista MD 
[address] [address] VIA Facsimile:  
  
 
Dear Angelica Bautista MD, Thank you for referring Mr. Hortencia Marie to [address] [place] [address] for evaluation. My notes for this consultation are attached. If you have questions, please do not hesitate to call me. I look forward to following your patient along with you. Sincerely, Nick Mendez MD

## 2019-05-18 ENCOUNTER — ED HISTORICAL/CONVERTED ENCOUNTER (OUTPATIENT)
Dept: OTHER | Age: 69
End: 2019-05-18

## 2019-05-28 ENCOUNTER — OP HISTORICAL/CONVERTED ENCOUNTER (OUTPATIENT)
Dept: OTHER | Age: 69
End: 2019-05-28

## 2019-06-19 ENCOUNTER — OP HISTORICAL/CONVERTED ENCOUNTER (OUTPATIENT)
Dept: OTHER | Age: 69
End: 2019-06-19

## 2019-08-13 ENCOUNTER — OFFICE VISIT (OUTPATIENT)
Dept: ENDOCRINOLOGY | Age: 69
End: 2019-08-13

## 2019-08-13 VITALS
HEIGHT: 77 IN | TEMPERATURE: 98.1 F | BODY MASS INDEX: 37.19 KG/M2 | WEIGHT: 315 LBS | DIASTOLIC BLOOD PRESSURE: 76 MMHG | RESPIRATION RATE: 18 BRPM | SYSTOLIC BLOOD PRESSURE: 116 MMHG | OXYGEN SATURATION: 97 % | HEART RATE: 68 BPM

## 2019-08-13 DIAGNOSIS — C61 PROSTATE CANCER (HCC): ICD-10-CM

## 2019-08-13 DIAGNOSIS — E11.22 CKD STAGE 3 SECONDARY TO DIABETES (HCC): ICD-10-CM

## 2019-08-13 DIAGNOSIS — E11.65 TYPE 2 DIABETES MELLITUS WITH HYPERGLYCEMIA, UNSPECIFIED WHETHER LONG TERM INSULIN USE (HCC): Primary | ICD-10-CM

## 2019-08-13 DIAGNOSIS — I10 ESSENTIAL HYPERTENSION: ICD-10-CM

## 2019-08-13 DIAGNOSIS — N18.30 CKD STAGE 3 SECONDARY TO DIABETES (HCC): ICD-10-CM

## 2019-08-13 RX ORDER — ABIRATERONE ACETATE 500 MG/1
TABLET, FILM COATED ORAL
COMMUNITY
Start: 2019-08-08

## 2019-08-13 RX ORDER — PREDNISONE 5 MG/1
TABLET ORAL
COMMUNITY
Start: 2019-08-08

## 2019-08-13 RX ORDER — OXYCODONE AND ACETAMINOPHEN 5; 325 MG/1; MG/1
TABLET ORAL
Refills: 0 | COMMUNITY
Start: 2019-06-15

## 2019-08-13 NOTE — PATIENT INSTRUCTIONS
-------------------------------------------------------------------------------------------- Refills    -    please call your pharmacy and have them send us a refill request 
 
Results  -  allow up to a week for lab results to be processed and reviewed. Phone calls  -  Allow upto 24 hrs. for non-urgent calls to be retained Prior authorization - It may take up to 4 weeks to process, depending on your insurance Forms  -  FMLA, DMV, patient assistance, etc. will take up to 2 weeks to process Cancellations - please notify the office in advance if you cannot keep your appointment Samples  - will only be dispensed at visits as supply is limited If you are having a medical emergency call 911 
 
-------------------------------------------------------------------------------------------- Do not skip meals Do not eat in between meals Reduce carbs- pasta, rice, potatoes, bread Do not drink juices or sodas Donot eat peanut butter Do not eat sugar free cookies and cakes Do not eat peaches, oranges, pineapples, grapes, and raisins  
 
 
----------------------------------------------------------------------------------------------------------------- 
 
 neurontin  300 mg  In AM  and increase to    600 mg at night time   ( 300 mg 2 pills ) STAY ON [facility] Check blood sugars immediately before each meal and at bedtime TOUJEO   Max   PENS   insulin  110  units  at bed time ( STOP TOUJEO PLAN ) Humalog  insulin 12   units before breakfast, 12  units before lunch and    INCREASE  15  units before dinner. Also, add additional humalog  as follows with meals  If blood sugars are[de-identified] 
 
150-200 mg 3 units 201-250 mg 6 units 251-300 mg 9 units 301-350 mg 12 units 351-400 mg 15 units 401-450 mg 18 units 451-500 mg 21 units Less than 70 mg NO INSULIN

## 2019-08-13 NOTE — LETTER
8/14/19 Patient: Judit Mederos YOB: 1950 Date of Visit: 8/13/2019 Linh Garza MD 
[address] [address] VIA Facsimile: [phone] Dear Linh Garza MD, Thank you for referring Mr. Judit Medreos to [address] [place] [address] for evaluation. My notes for this consultation are attached. If you have questions, please do not hesitate to call me. I look forward to following your patient along with you. Sincerely, Manas He MD

## 2019-08-13 NOTE — PROGRESS NOTES
HISTORY OF PRESENT ILLNESS  Clara Healy is a 80 y.o. male.   Patient here for  FOLLOW UP AFTER last  visit  For  Type 2 diabetes mellitus  From May   2019       Lost 6 lbs   He had MRI and bone scan which showed prostrate cancer spread to bones explaining for severe pain in his legs   He is now back onto chemo therapy  Pain is bearable   No meter for review            Old history      He continues to have pain in left leg   His pcp gave tylenol -codeine   Pt says he cannot even walk     SUGARS ARE BETTER ON LOG           Old history :      He developed acute pain on left leg   Went to ER and got onto prednisone and oxycodone   eval was negative     He still has pain in mid shin on left side         Old hsitory     He finds himself to be doing very well   He is using diet correct and insulins correctly     Accompanied by wife   He got the log      OLD HISTORY   Referred : by self    H/o diabetes for many  years     Accompanied by  Wife     Current A1C is over 10 %  and symptoms/problems include polyuria, polydipsia and visual disturbances     Current diabetic medications include intensive insulin injection program.   HE DOES NOT EAT RIGHT, DOES NOT CHECK PER  WIFE       APPEARS DEPRESSED   YET TO GO FOR RADIATION TREATMENTS FOR PROSTRATE CANCER      Current monitoring regimen: home blood tests - rarely  Home blood sugar records: trend: fluctuating a lot  Any episodes of hypoglycemia? no    Weight trend: stable  Prior visit with dietician: no  Current diet: \"unhealthy\" diet in general  Current exercise: no regular exercise    Known diabetic complications: nephropathy and peripheral neuropathy  Cardiovascular risk factors: dyslipidemia, diabetes mellitus, obesity, sedentary life style, male gender, hypertension, stress    Eye exam current (within one year): yes  ADAN: unknown     Past Medical History:   Diagnosis Date    [facility] ([facility]     Diabetes ([facility]     High cholesterol     Hypertension     Neuropathy      History reviewed. No pertinent surgical history. Current Outpatient Medications   Medication Sig    ZYTIGA 500 mg tab     predniSONE (DELTASONE) 5 mg tablet     oxyCODONE-acetaminophen (PERCOCET) 5-325 mg per tablet TK 1 TO 2 TS PO Q 4 TO 6 H PRN    pravastatin (PRAVACHOL) 20 mg tablet Take 1 Tab by mouth nightly.  tamsulosin (FLOMAX) 0.4 mg capsule TK 1 C PO QHS    gabapentin (NEURONTIN) 300 mg capsule One capsule in the morning, two capsules at night. Stop 100 mg dose    insulin glargine U-300 conc (TOUJEO MAX U-300 SOLOSTAR) 300 unit/mL (3 mL) inpn 110 Units by SubCUTAneous route nightly. Stop plain toujeo    insulin lispro (HUMALOG KWIKPEN INSULIN) 100 unit/mL kwikpen INJECT 12 UNITS BEFORE BREAKFAST AND LUNCH, AND 15 UNITS BEFORE DINNER. PLUS SLIDING SCALE TO  UNITS DAILY    furosemide (LASIX) 40 mg tablet     BIDIL 20-37.5 mg per tablet     metoprolol succinate (TOPROL-XL) 100 mg tablet     telmisartan (MICARDIS) 80 mg tablet     JANUVIA 50 mg tablet     XARELTO 20 mg tab tablet     aspirin delayed-release 81 mg tablet Take  by mouth daily.  loratadine 10 mg cap Take  by mouth.  sodium bicarbonate 650 mg tablet Take  by mouth four (4) times daily.  Insulin Needles, Disposable, (BD ULTRA-FINE SHORT PEN NEEDLE) 31 gauge x 5/16\" ndle Use 4 times daily. Dx code E11.65     No current facility-administered medications for this visit. Review of Systems   Constitutional: Positive for malaise/fatigue. HENT: Negative. Eyes: Negative for pain and redness. Respiratory: Negative. Cardiovascular: Negative for chest pain, palpitations and leg swelling. Gastrointestinal: Negative. Negative for constipation. Genitourinary: Positive for frequency. Musculoskeletal: Negative for myalgias. Skin: Negative. Neurological: Negative. Endo/Heme/Allergies: Negative. Psychiatric/Behavioral: Negative for depression and memory loss. The patient does not have insomnia. Physical Exam   Constitutional: He is oriented to person, place, and time. He appears well-developed and well-nourished. HENT:   Head: Normocephalic. Eyes: Conjunctivae and EOM are normal. Pupils are equal, round, and reactive to light. Neck: Normal range of motion. Neck supple. No JVD present. No tracheal deviation present. No thyromegaly present. Cardiovascular: Normal rate, regular rhythm and normal heart sounds. Pulmonary/Chest: Effort normal and breath sounds normal.   Abdominal: Soft. Bowel sounds are normal.   Musculoskeletal: left leg not tender   Lymphadenopathy:     He has no cervical adenopathy. Neurological: He is alert and oriented to person, place, and time. He has normal reflexes. Skin: Skin is warm. Psychiatric: not depressed       Diabetic foot exam: aug 2018     Left Foot:   Visual Exam: normal   Bunion present , flat foot    Pulse DP: 2+ (normal)   Filament test: ABSENT          Right Foot:   Visual Exam: normal  bunion present , flat foot    Pulse DP: 2+ (normal)   Filament test: ABSENT         Lab Results   Component Value Date/Time    Hemoglobin A1c 6.9 (H) 08/06/2019 01:16 PM    Hemoglobin A1c 7.6 (H) 04/30/2019 10:28 AM    Hemoglobin A1c 7.9 (H) 02/26/2019 08:22 AM    Hemoglobin A1c, External 11.3 03/24/2018    Glucose 82 08/06/2019 01:16 PM    Glucose  08/31/2018 01:02 PM    Microalb/Creat ratio (ug/mg creat.) 11.3 08/06/2019 01:16 PM    LDL, calculated 68 08/06/2019 01:16 PM    Creatinine 1.88 (H) 08/06/2019 01:16 PM      Lab Results   Component Value Date/Time    Cholesterol, total 132 08/06/2019 01:16 PM    HDL Cholesterol 46 08/06/2019 01:16 PM    LDL, calculated 68 08/06/2019 01:16 PM    LDL-C, External 47 03/24/2018    Triglyceride 91 08/06/2019 01:16 PM     Lab Results   Component Value Date/Time    ALT (SGPT) 14 08/06/2019 01:16 PM    AST (SGOT) 15 08/06/2019 01:16 PM    Alk.  phosphatase 165 (H) 08/06/2019 01:16 PM    Bilirubin, total 0.4 08/06/2019 01:16 PM    Albumin 4.0 08/06/2019 01:16 PM    Protein, total 7.4 08/06/2019 01:16 PM     Lab Results   Component Value Date/Time    GFR est non-AA 36 (L) 08/06/2019 01:16 PM    GFR est AA 42 (L) 08/06/2019 01:16 PM    Creatinine 1.88 (H) 08/06/2019 01:16 PM    BUN 43 (H) 08/06/2019 01:16 PM    Sodium 147 (H) 08/06/2019 01:16 PM    Potassium 4.7 08/06/2019 01:16 PM    Chloride 99 08/06/2019 01:16 PM    CO2 33 (H) 08/06/2019 01:16 PM         ASSESSMENT and PLAN    1. Type 2 DM, un controlled : a1c is  6.9 %    From august 2019    Compared to   7.6 %       From april 30 2019    Compared to  7.9 %     From   Feb 2019     Compared to   8 %      FROM OCT 2018      COMPARED TO  over 10 %         Improved CONTROL significantly   Did not review  the glucose log     Continue on basal bolus regimen     Patient is advised about checking blood sugars 4 times a day and maintaining log book. The danger of having low blood sugars has been explained with inappropriate use of insulin  Patient voiced understanding and using the printed instructions at home. Hypoglycemia management has been explained to the patient. Asking patient to AVOID HIGH GI FOODS   lab results and schedule of future lab studies reviewed with patient        2. Hypoglycemia :  Educated on treating the hypoglycemia. Discussed INSULIN use and prescribed    3. HTN : continue telmisartan . Patient is educated about importance of compliance with anti-hypertensives especially ARB/ACEI    4. Dyslipidemia : continue current meds. Patient is educated about benefits and adverse effects of statins and explained how benefits outweigh risk. 5. use of aspirin to prevent MI and TIA's discussed    6. 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  CKD -  Stage 3 ckd    worser ( lasix 40 mg )  ADJUSTING FOR 20 MG OF LASIX DAILY   F/U WITH DR. Laci Saxena      7. H/o afib  On jess   F/u with Dr. Bhaskar Salinas       > 50 % of time is spent on counseling   Patient voiced understanding her plan of care

## 2019-08-13 NOTE — PROGRESS NOTES
1. Have you been to the ER, urgent care clinic since your last visit? Hospitalized since your last visit? No    2. Have you seen or consulted any other health care providers outside of the [address] [place] [address] since your last visit? Include any pap smears or colon screening. No     Chief Complaint   Patient presents with    Diabetes     follow up(no meter)     Learning assessment complete  Abuse Screening Questionnaire 8/13/2019   Do you ever feel afraid of your partner? N   Are you in a relationship with someone who physically or mentally threatens you? N   Is it safe for you to go home? Y       Medication reviewed with patient to the best of his ability and nurse document \"not taking\" and/or \"taking\" based on patients response.   Lab Results   Component Value Date/Time    Hemoglobin A1c 6.9 (H) 08/06/2019 01:16 PM    Hemoglobin A1c (POC) 10.6 08/31/2018 01:02 PM    Hemoglobin A1c, External 11.3 03/24/2018     Wt Readings from Last 3 Encounters:   08/13/19 316 lb 8 oz (143.6 kg)   05/07/19 324 lb (147 kg)   03/06/19 327 lb 9.6 oz (148.6 kg)     Temp Readings from Last 3 Encounters:   08/13/19 98.1 °F (36.7 °C) (Oral)   05/07/19 97.1 °F (36.2 °C) (Oral)   03/06/19 95.5 °F (35.3 °C) (Oral)     BP Readings from Last 3 Encounters:   08/13/19 116/76   05/07/19 110/65   03/06/19 129/82     Pulse Readings from Last 3 Encounters:   08/13/19 68   05/07/19 80   03/06/19 67

## 2019-09-06 DIAGNOSIS — G62.9 NEUROPATHY: ICD-10-CM

## 2019-09-06 DIAGNOSIS — E11.65 TYPE 2 DIABETES MELLITUS WITH HYPERGLYCEMIA, UNSPECIFIED WHETHER LONG TERM INSULIN USE (HCC): Primary | ICD-10-CM

## 2019-09-08 RX ORDER — GABAPENTIN 300 MG/1
CAPSULE ORAL
Qty: 90 CAP | Refills: 0 | Status: SHIPPED | OUTPATIENT
Start: 2019-09-08 | End: 2019-10-16 | Stop reason: SDUPTHER

## 2019-09-12 RX ORDER — PEN NEEDLE, DIABETIC 30 GX3/16"
NEEDLE, DISPOSABLE MISCELLANEOUS
Qty: 200 PACKAGE | Refills: 4 | Status: SHIPPED | OUTPATIENT
Start: 2019-09-12 | End: 2019-10-25 | Stop reason: SDUPTHER

## 2019-10-11 ENCOUNTER — ED HISTORICAL/CONVERTED ENCOUNTER (OUTPATIENT)
Dept: OTHER | Age: 69
End: 2019-10-11

## 2019-10-16 DIAGNOSIS — G62.9 NEUROPATHY: ICD-10-CM

## 2019-10-16 DIAGNOSIS — E11.65 TYPE 2 DIABETES MELLITUS WITH HYPERGLYCEMIA, UNSPECIFIED WHETHER LONG TERM INSULIN USE (HCC): ICD-10-CM

## 2019-10-16 RX ORDER — GABAPENTIN 300 MG/1
CAPSULE ORAL
Qty: 90 CAP | Refills: 4 | Status: SHIPPED | OUTPATIENT
Start: 2019-10-16 | End: 2020-01-08

## 2019-10-25 RX ORDER — PEN NEEDLE, DIABETIC 30 GX3/16"
NEEDLE, DISPOSABLE MISCELLANEOUS
Qty: 200 PACKAGE | Refills: 4 | Status: SHIPPED | OUTPATIENT
Start: 2019-10-25 | End: 2020-06-29

## 2019-10-25 NOTE — TELEPHONE ENCOUNTER
PCP: Chichi Barrera MD    Last appt: 8/13/2019  Future Appointments   Date Time Provider Yen Rosario   1/2/2020  9:15 AM CDE NURSE CDE [facility]   1/8/2020  9:45 AM Nupur Branch MD CDE [facility]       Requested Prescriptions     Pending Prescriptions Disp Refills    Insulin Needles, Disposable, (BD ULTRA-FINE SHORT PEN NEEDLE) 31 gauge x 5/16\" ndle 200 Package 4     Sig: USE AS DIRECTED FOUR TIMES DAILY     Signed Prescriptions Disp Refills    gabapentin (NEURONTIN) 300 mg capsule 90 Cap 4     Sig: TAKE ONE CAPSULE BY MOUTH EVERY MORNING AND 2 CAPSULES EVERY NIGHT     Authorizing Provider: Rm Lira

## 2019-11-22 DIAGNOSIS — E11.65 TYPE 2 DIABETES MELLITUS WITH HYPERGLYCEMIA, UNSPECIFIED WHETHER LONG TERM INSULIN USE (HCC): ICD-10-CM

## 2019-11-22 RX ORDER — INSULIN LISPRO 100 [IU]/ML
INJECTION, SOLUTION INTRAVENOUS; SUBCUTANEOUS
Qty: 90 ML | Refills: 0 | Status: SHIPPED | OUTPATIENT
Start: 2019-11-22 | End: 2020-01-08 | Stop reason: SDUPTHER

## 2019-12-19 ENCOUNTER — OP HISTORICAL/CONVERTED ENCOUNTER (OUTPATIENT)
Dept: OTHER | Age: 69
End: 2019-12-19

## 2019-12-23 RX ORDER — PRAVASTATIN SODIUM 20 MG/1
TABLET ORAL
Qty: 30 TAB | Refills: 6 | Status: SHIPPED | OUTPATIENT
Start: 2019-12-23

## 2020-01-02 ENCOUNTER — HOSPITAL ENCOUNTER (OUTPATIENT)
Dept: LAB | Age: 70
Discharge: HOME | End: 2020-01-02

## 2020-01-02 ENCOUNTER — LAB ONLY (OUTPATIENT)
Dept: ENDOCRINOLOGY | Age: 70
End: 2020-01-02

## 2020-01-02 DIAGNOSIS — E11.65 TYPE 2 DIABETES MELLITUS WITH HYPERGLYCEMIA, UNSPECIFIED WHETHER LONG TERM INSULIN USE (HCC): ICD-10-CM

## 2020-01-02 DIAGNOSIS — E11.65 TYPE 2 DIABETES MELLITUS WITH HYPERGLYCEMIA, UNSPECIFIED WHETHER LONG TERM INSULIN USE (HCC): Primary | ICD-10-CM

## 2020-01-02 LAB
ALBUMIN SERPL-MCNC: 2.9 G/DL (ref 3.5–5)
ALBUMIN/GLOB SERPL: 0.6 {RATIO} (ref 1.1–2.2)
ALP SERPL-CCNC: 95 U/L (ref 45–117)
ALT SERPL-CCNC: 18 U/L (ref 12–78)
ANION GAP SERPL CALC-SCNC: 4 MMOL/L (ref 5–15)
AST SERPL-CCNC: 16 U/L (ref 15–37)
BILIRUB SERPL-MCNC: 0.5 MG/DL (ref 0.2–1)
BUN SERPL-MCNC: 20 MG/DL (ref 6–20)
BUN/CREAT SERPL: 15 (ref 12–20)
CALCIUM SERPL-MCNC: 8.3 MG/DL (ref 8.5–10.1)
CHLORIDE SERPL-SCNC: 107 MMOL/L (ref 97–108)
CHOLEST SERPL-MCNC: 125 MG/DL
CO2 SERPL-SCNC: 33 MMOL/L (ref 21–32)
CREAT SERPL-MCNC: 1.36 MG/DL (ref 0.7–1.3)
CREAT UR-MCNC: 192 MG/DL
EST. AVERAGE GLUCOSE BLD GHB EST-MCNC: 163 MG/DL
GLOBULIN SER CALC-MCNC: 4.5 G/DL (ref 2–4)
GLUCOSE SERPL-MCNC: 90 MG/DL (ref 65–100)
HBA1C MFR BLD: 7.3 % (ref 4–5.6)
HDLC SERPL-MCNC: 49 MG/DL
HDLC SERPL: 2.6 {RATIO} (ref 0–5)
LDLC SERPL CALC-MCNC: 54.4 MG/DL (ref 0–100)
LIPID PROFILE,FLP: NORMAL
MICROALBUMIN UR-MCNC: 5.69 MG/DL
MICROALBUMIN/CREAT UR-RTO: 30 MG/G (ref 0–30)
POTASSIUM SERPL-SCNC: 3.8 MMOL/L (ref 3.5–5.1)
PROT SERPL-MCNC: 7.4 G/DL (ref 6.4–8.2)
SODIUM SERPL-SCNC: 144 MMOL/L (ref 136–145)
TRIGL SERPL-MCNC: 108 MG/DL (ref ?–150)
VLDLC SERPL CALC-MCNC: 21.6 MG/DL

## 2020-01-08 ENCOUNTER — OFFICE VISIT (OUTPATIENT)
Dept: ENDOCRINOLOGY | Age: 70
End: 2020-01-08

## 2020-01-08 VITALS
TEMPERATURE: 96.7 F | SYSTOLIC BLOOD PRESSURE: 139 MMHG | DIASTOLIC BLOOD PRESSURE: 104 MMHG | RESPIRATION RATE: 20 BRPM | HEIGHT: 77 IN | BODY MASS INDEX: 37.19 KG/M2 | WEIGHT: 315 LBS | HEART RATE: 60 BPM | OXYGEN SATURATION: 97 %

## 2020-01-08 DIAGNOSIS — G62.9 NEUROPATHY: ICD-10-CM

## 2020-01-08 DIAGNOSIS — E11.65 TYPE 2 DIABETES MELLITUS WITH HYPERGLYCEMIA, UNSPECIFIED WHETHER LONG TERM INSULIN USE (HCC): ICD-10-CM

## 2020-01-08 DIAGNOSIS — C79.51 PROSTATE CANCER METASTATIC TO BONE (HCC): ICD-10-CM

## 2020-01-08 DIAGNOSIS — E11.65 TYPE 2 DIABETES MELLITUS WITH HYPERGLYCEMIA, UNSPECIFIED WHETHER LONG TERM INSULIN USE (HCC): Primary | ICD-10-CM

## 2020-01-08 DIAGNOSIS — E11.22 CKD STAGE 3 SECONDARY TO DIABETES (HCC): ICD-10-CM

## 2020-01-08 DIAGNOSIS — I10 ESSENTIAL HYPERTENSION: ICD-10-CM

## 2020-01-08 DIAGNOSIS — N18.30 CKD STAGE 3 SECONDARY TO DIABETES (HCC): ICD-10-CM

## 2020-01-08 DIAGNOSIS — C61 PROSTATE CANCER METASTATIC TO BONE (HCC): ICD-10-CM

## 2020-01-08 DIAGNOSIS — C61 PROSTATE CANCER (HCC): ICD-10-CM

## 2020-01-08 RX ORDER — GABAPENTIN 300 MG/1
CAPSULE ORAL
Qty: 90 CAP | Refills: 3 | Status: SHIPPED | OUTPATIENT
Start: 2020-01-08 | End: 2020-05-08

## 2020-01-08 RX ORDER — GABAPENTIN 300 MG/1
CAPSULE ORAL
Qty: 90 CAP | Refills: 5 | Status: CANCELLED | OUTPATIENT
Start: 2020-01-08

## 2020-01-08 RX ORDER — BLOOD-GLUCOSE METER
EACH MISCELLANEOUS
Qty: 1 EACH | Refills: 0 | Status: SHIPPED | OUTPATIENT
Start: 2020-01-08

## 2020-01-08 RX ORDER — INSULIN LISPRO 100 [IU]/ML
INJECTION, SOLUTION INTRAVENOUS; SUBCUTANEOUS
Qty: 30 ML | Refills: 6 | Status: SHIPPED | OUTPATIENT
Start: 2020-01-08

## 2020-01-08 RX ORDER — LANCING DEVICE/LANCETS
KIT MISCELLANEOUS
Qty: 200 KIT | Refills: 6 | Status: SHIPPED | OUTPATIENT
Start: 2020-01-08

## 2020-01-08 RX ORDER — CARVEDILOL 3.12 MG/1
TABLET ORAL
COMMUNITY
Start: 2019-12-13

## 2020-01-08 NOTE — PROGRESS NOTES
HISTORY OF PRESENT ILLNESS  Miguel Chung is a 77 y.o. male.   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 explaining for severe pain in his legs     This visit,  Wife says pt is diagnosed with left side hip fracture and pt is in severe pain  Still   He is now on chemo therapy  He is now on prednisone     Got  meter for review            Old history      He continues to have pain in left leg   His pcp gave tylenol -codeine   Pt says he cannot even walk     SUGARS ARE BETTER ON LOG           Old history :      He developed acute pain on left leg   Went to ER and got onto prednisone and oxycodone   eval was negative   He still has pain in mid shin on left side         Old hsitory     He finds himself to be doing very well   He is using diet correct and insulins correctly   Accompanied by wife   He got the log      OLD HISTORY   Referred : by self    H/o diabetes for many  years     Accompanied by  Wife     Current A1C is over 10 %  and symptoms/problems include polyuria, polydipsia and visual disturbances     Current diabetic medications include intensive insulin injection program.   HE DOES NOT EAT RIGHT, DOES NOT CHECK PER  WIFE       APPEARS DEPRESSED   YET TO GO FOR RADIATION TREATMENTS FOR PROSTRATE CANCER      Current monitoring regimen: home blood tests - rarely  Home blood sugar records: trend: fluctuating a lot  Any episodes of hypoglycemia? no    Weight trend: stable  Prior visit with dietician: no  Current diet: \"unhealthy\" diet in general  Current exercise: no regular exercise    Known diabetic complications: nephropathy and peripheral neuropathy  Cardiovascular risk factors: dyslipidemia, diabetes mellitus, obesity, sedentary life style, male gender, hypertension, stress    Eye exam current (within one year): yes  ADAN: unknown     Past Medical History:   Diagnosis Date    [facility]     Diabetes [facility]     High cholesterol     Hypertension     Neuropathy      History reviewed. No pertinent surgical history. Current Outpatient Medications   Medication Sig    carvedilol (COREG) 3.125 mg tablet TK 1 T PO BID    pravastatin (PRAVACHOL) 20 mg tablet TAKE 1 TABLET BY MOUTH EVERY NIGHT    insulin lispro (HUMALOG KWIKPEN INSULIN) 100 unit/mL kwikpen INJECT 12 UNITS BEFORE BREAKFAST AND LUNCH, AND 15 UNITS BEFORE DINNER, PLUS SLIDING SCALE.  UNITS DAILY.  Insulin Needles, Disposable, (BD ULTRA-FINE SHORT PEN NEEDLE) 31 gauge x 5/16\" ndle USE AS DIRECTED FOUR TIMES DAILY    gabapentin (NEURONTIN) 300 mg capsule TAKE ONE CAPSULE BY MOUTH EVERY MORNING AND 2 CAPSULES EVERY NIGHT    ZYTIGA 500 mg tab     predniSONE (DELTASONE) 5 mg tablet     oxyCODONE-acetaminophen (PERCOCET) 5-325 mg per tablet TK 1 TO 2 TS PO Q 4 TO 6 H PRN    tamsulosin (FLOMAX) 0.4 mg capsule TK 1 C PO QHS    insulin glargine U-300 conc (TOUJEO MAX U-300 SOLOSTAR) 300 unit/mL (3 mL) inpn 110 Units by SubCUTAneous route nightly. Stop plain toujeo    furosemide (LASIX) 40 mg tablet     BIDIL 20-37.5 mg per tablet     JANUVIA 50 mg tablet     XARELTO 20 mg tab tablet     aspirin delayed-release 81 mg tablet Take  by mouth daily.  loratadine 10 mg cap Take  by mouth.  sodium bicarbonate 650 mg tablet Take  by mouth four (4) times daily.  metoprolol succinate (TOPROL-XL) 100 mg tablet     telmisartan (MICARDIS) 80 mg tablet      No current facility-administered medications for this visit. Review of Systems   Constitutional: Positive for malaise/fatigue. HENT: Negative. Eyes: Negative for pain and redness. Respiratory: Negative. Cardiovascular: Negative for chest pain, palpitations and leg swelling. Gastrointestinal: Negative. Negative for constipation. Genitourinary: Positive for frequency.    Musculoskeletal: pain on left side continuously Skin: Negative. Neurological: Negative. Endo/Heme/Allergies: Negative. Psychiatric/Behavioral: Negative for depression and memory loss. The patient does not have insomnia. Physical Exam   Constitutional: He is oriented to person, place, and time. He appears well-developed and well-nourished. HENT:   Head: Normocephalic. Eyes: Conjunctivae and EOM are normal. Pupils are equal, round, and reactive to light. Neck: Normal range of motion. Neck supple. No JVD present. No tracheal deviation present. No thyromegaly present. Cardiovascular: Normal rate, regular rhythm and normal heart sounds. Pulmonary/Chest: Effort normal and breath sounds normal.   Abdominal: Soft. Bowel sounds are normal.   Musculoskeletal: left leg not tender   Lymphadenopathy:     He has no cervical adenopathy. Neurological: He is weak on left side and  Is using the crutches   Skin: Skin is warm.    Psychiatric: not depressed       Diabetic foot exam: aug 2018     Left Foot:   Visual Exam: normal   Bunion present , flat foot    Pulse DP: 2+ (normal)   Filament test: ABSENT          Right Foot:   Visual Exam: normal  bunion present , flat foot    Pulse DP: 2+ (normal)   Filament test: ABSENT         Lab Results   Component Value Date/Time    Hemoglobin A1c 7.3 (H) 01/02/2020 09:00 AM    Hemoglobin A1c 6.9 (H) 08/06/2019 01:16 PM    Hemoglobin A1c 7.6 (H) 04/30/2019 10:28 AM    Hemoglobin A1c, External 11.3 03/24/2018    Glucose 90 01/02/2020 09:00 AM    Glucose  08/31/2018 01:02 PM    Microalbumin/Creat ratio (mg/g creat) 30 01/02/2020 09:00 AM    Microalbumin,urine random 5.69 01/02/2020 09:00 AM    LDL, calculated 54.4 01/02/2020 09:00 AM    Creatinine 1.36 (H) 01/02/2020 09:00 AM      Lab Results   Component Value Date/Time    Cholesterol, total 125 01/02/2020 09:00 AM    HDL Cholesterol 49 01/02/2020 09:00 AM    LDL, calculated 54.4 01/02/2020 09:00 AM    LDL-C, External 47 03/24/2018    Triglyceride 108 01/02/2020 09:00 AM    CHOL/HDL Ratio 2.6 01/02/2020 09:00 AM     Lab Results   Component Value Date/Time    ALT (SGPT) 18 01/02/2020 09:00 AM    AST (SGOT) 16 01/02/2020 09:00 AM    Alk. phosphatase 95 01/02/2020 09:00 AM    Bilirubin, total 0.5 01/02/2020 09:00 AM    Albumin 2.9 (L) 01/02/2020 09:00 AM    Protein, total 7.4 01/02/2020 09:00 AM     Lab Results   Component Value Date/Time    GFR est non-AA 52 (L) 01/02/2020 09:00 AM    GFR est AA >60 01/02/2020 09:00 AM    Creatinine 1.36 (H) 01/02/2020 09:00 AM    BUN 20 01/02/2020 09:00 AM    Sodium 144 01/02/2020 09:00 AM    Potassium 3.8 01/02/2020 09:00 AM    Chloride 107 01/02/2020 09:00 AM    CO2 33 (H) 01/02/2020 09:00 AM         ASSESSMENT and PLAN    1. Type 2 DM, un controlled : a1c is  7.3 %    From jan 2020   Compared to   6.9 %    From august 2019    Compared to   7.6 %       From april 30 2019    Compared to  7.9 %     From   Feb 2019     Compared to   8 %      FROM OCT 2018      COMPARED TO  over 10 %     Slight loss  Of  CONTROL significantly , on prednisone 5 mg bid    reviewed  the glucose log . Sugars are slightly higher    He is in lot of pain - from prostrate cancer       On prednisone 5 mg bid  With chemotherapy  Increased doses on  basal bolus regimen     Patient is advised about checking blood sugars 4 times a day and maintaining log book. The danger of having low blood sugars has been explained with inappropriate use of insulin  Patient voiced understanding and using the printed instructions at home. Hypoglycemia management has been explained to the patient. Asking patient to AVOID HIGH GI FOODS   lab results and schedule of future lab studies reviewed with patient        2. Hypoglycemia :  Educated on treating the hypoglycemia. Discussed INSULIN use and prescribed    3. HTN : continue telmisartan . Patient is educated about importance of compliance with anti-hypertensives especially ARB/ACEI    4. Dyslipidemia : continue current meds. Patient is educated about benefits and adverse effects of statins and explained how benefits outweigh risk. 5. use of aspirin to prevent MI and TIA's discussed    6. 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  CKD -  Stage 3 ckd    worser ( lasix 40 mg )  ADJUSTING FOR 20 MG OF LASIX DAILY   F/U WITH DR. Elizabeth Martinez      7. H/o afib  On xarelto   F/u with Dr. Bowden Purchase       > 50 % of time is spent on counseling   Patient voiced understanding her plan of care

## 2020-01-08 NOTE — PROGRESS NOTES
1. Have you been to the ER, urgent care clinic since your last visit? No  Hospitalized since your last visit? No    2. Have you seen or consulted any other health care providers outside of the [address] [place] [address] since your last visit? Include any pap smears or colon screening. No    Wt Readings from Last 3 Encounters:   01/08/20 318 lb 12.8 oz (144.6 kg)   08/13/19 316 lb 8 oz (143.6 kg)   05/07/19 324 lb (147 kg)     Temp Readings from Last 3 Encounters:   01/08/20 96.7 °F (35.9 °C) (Oral)   08/13/19 98.1 °F (36.7 °C) (Oral)   05/07/19 97.1 °F (36.2 °C) (Oral)     BP Readings from Last 3 Encounters:   01/08/20 (!) 139/104   08/13/19 116/76   05/07/19 110/65     Pulse Readings from Last 3 Encounters:   01/08/20 60   08/13/19 68   05/07/19 80     Lab Results   Component Value Date/Time    Hemoglobin A1c 7.3 (H) 01/02/2020 09:00 AM    Hemoglobin A1c (POC) 10.6 08/31/2018 01:02 PM    Hemoglobin A1c, External 11.3 03/24/2018     Patient has meter today.   B/P 139/104

## 2020-01-08 NOTE — LETTER
1/9/20 Patient: Alexsandra Ferrera YOB: 1950 Date of Visit: 1/8/2020 Varsha Lyons MD 
[address] [address] VIA Facsimile: [phone] Dear Varsha Lyons MD, Thank you for referring Mr. Alexsandra Ferrera to [address] [place] [address] for evaluation. My notes for this consultation are attached. If you have questions, please do not hesitate to call me. I look forward to following your patient along with you. Sincerely, Geneva Galloway MD

## 2020-01-08 NOTE — PATIENT INSTRUCTIONS
--------------------------------------------------------------------------------------------    Refills    -    please call your pharmacy and have them send us a refill request    Results  -  allow up to a week for lab results to be processed and reviewed. Phone calls  -  Allow upto 24 hrs. for non-urgent calls to be retained    Prior authorization - It may take up to 4 weeks to process, depending on your insurance    Forms  -  FMLA, DMV, patient assistance, etc. will take up to 2 weeks to process    Cancellations - please notify the office in advance if you cannot keep your appointment    Samples  - will only be dispensed at visits as supply is limited      If you are having a medical emergency call 911    --------------------------------------------------------------------------------------------      Do not skip meals  Do not eat in between meals    Reduce carbs- pasta, rice, potatoes, bread   Do not drink juices or sodas  Donot eat peanut butter     Do not eat sugar free cookies and cakes   Do not eat peaches, oranges, pineapples, grapes, and raisins       -----------------------------------------------------------------------------------------------------------------     neurontin  300 mg  In AM  and increase to    600 mg at night time   ( 300 mg 2 pills )    STAY ON JANUVIA       Check blood sugars immediately before each meal and at bedtime       increase   TOUJEO   Max   PENS   insulin  120   units  at bed time ( STOP TOUJEO PLAN )     Humalog  insulin 12   units before breakfast, 12  units before lunch and    INCREASE  16  units before dinner.     Also, add additional humalog  as follows with meals  If blood sugars are[de-identified]    150-200 mg 3 units    201-250 mg 6 units    251-300 mg 9 units    301-350 mg 12 units    351-400 mg 15 units    401-450 mg 18 units    451-500 mg 21 units     Less than 70 mg NO INSULIN

## 2020-01-23 ENCOUNTER — OP HISTORICAL/CONVERTED ENCOUNTER (OUTPATIENT)
Dept: OTHER | Age: 70
End: 2020-01-23

## 2020-01-28 ENCOUNTER — OP HISTORICAL/CONVERTED ENCOUNTER (OUTPATIENT)
Dept: OTHER | Age: 70
End: 2020-01-28

## 2020-02-07 ENCOUNTER — OP HISTORICAL/CONVERTED ENCOUNTER (OUTPATIENT)
Dept: OTHER | Age: 70
End: 2020-02-07

## 2020-02-27 ENCOUNTER — OP HISTORICAL/CONVERTED ENCOUNTER (OUTPATIENT)
Dept: OTHER | Age: 70
End: 2020-02-27

## 2020-03-02 ENCOUNTER — OP HISTORICAL/CONVERTED ENCOUNTER (OUTPATIENT)
Dept: OTHER | Age: 70
End: 2020-03-02

## 2020-03-18 ENCOUNTER — IP HISTORICAL/CONVERTED ENCOUNTER (OUTPATIENT)
Dept: OTHER | Age: 70
End: 2020-03-18

## 2020-03-27 ENCOUNTER — OP HISTORICAL/CONVERTED ENCOUNTER (OUTPATIENT)
Dept: OTHER | Age: 70
End: 2020-03-27

## 2020-03-30 ENCOUNTER — OP HISTORICAL/CONVERTED ENCOUNTER (OUTPATIENT)
Dept: OTHER | Age: 70
End: 2020-03-30

## 2020-04-13 DIAGNOSIS — E11.65 TYPE 2 DIABETES MELLITUS WITH HYPERGLYCEMIA, UNSPECIFIED WHETHER LONG TERM INSULIN USE (HCC): Primary | ICD-10-CM

## 2020-04-13 DIAGNOSIS — G62.9 NEUROPATHY: ICD-10-CM

## 2020-04-13 DIAGNOSIS — E78.2 MIXED HYPERLIPIDEMIA: ICD-10-CM

## 2020-04-15 ENCOUNTER — IP HISTORICAL/CONVERTED ENCOUNTER (OUTPATIENT)
Dept: OTHER | Age: 70
End: 2020-04-15

## 2020-05-08 DIAGNOSIS — E11.65 TYPE 2 DIABETES MELLITUS WITH HYPERGLYCEMIA, UNSPECIFIED WHETHER LONG TERM INSULIN USE (HCC): ICD-10-CM

## 2020-05-08 DIAGNOSIS — G62.9 NEUROPATHY: ICD-10-CM

## 2020-05-08 RX ORDER — GABAPENTIN 300 MG/1
CAPSULE ORAL
Qty: 90 CAP | Refills: 1 | Status: SHIPPED | OUTPATIENT
Start: 2020-05-08

## 2020-06-29 RX ORDER — PEN NEEDLE, DIABETIC 30 GX3/16"
NEEDLE, DISPOSABLE MISCELLANEOUS
Qty: 1 PACKAGE | Refills: 4 | Status: SHIPPED | OUTPATIENT
Start: 2020-06-29

## 2022-03-19 PROBLEM — E66.01 SEVERE OBESITY (HCC): Status: ACTIVE | Noted: 2019-03-06

## 2023-05-25 RX ORDER — ISOSORBIDE DINITRATE AND HYDRALAZINE HYDROCHLORIDE 37.5; 2 MG/1; MG/1
TABLET ORAL
COMMUNITY
Start: 2018-07-16

## 2023-05-25 RX ORDER — TELMISARTAN 80 MG/1
TABLET ORAL
COMMUNITY
Start: 2018-08-07

## 2023-05-25 RX ORDER — FUROSEMIDE 40 MG/1
TABLET ORAL
COMMUNITY
Start: 2018-06-30

## 2023-05-25 RX ORDER — GABAPENTIN 300 MG/1
CAPSULE ORAL
COMMUNITY
Start: 2020-05-08

## 2023-05-25 RX ORDER — ASPIRIN 81 MG/1
TABLET ORAL DAILY
COMMUNITY

## 2023-05-25 RX ORDER — ABIRATERONE 500 MG/1
TABLET ORAL
COMMUNITY
Start: 2019-08-08

## 2023-05-25 RX ORDER — OXYCODONE HYDROCHLORIDE AND ACETAMINOPHEN 5; 325 MG/1; MG/1
TABLET ORAL
COMMUNITY
Start: 2019-06-15

## 2023-05-25 RX ORDER — TAMSULOSIN HYDROCHLORIDE 0.4 MG/1
CAPSULE ORAL
COMMUNITY
Start: 2019-02-11

## 2023-05-25 RX ORDER — CARVEDILOL 3.12 MG/1
1 TABLET ORAL 2 TIMES DAILY
COMMUNITY
Start: 2019-12-13

## 2023-05-25 RX ORDER — PREDNISONE 5 MG/1
TABLET ORAL
COMMUNITY
Start: 2019-08-08

## 2023-05-25 RX ORDER — INSULIN LISPRO 100 [IU]/ML
INJECTION, SOLUTION INTRAVENOUS; SUBCUTANEOUS
COMMUNITY
Start: 2020-01-08

## 2023-05-25 RX ORDER — METOPROLOL SUCCINATE 100 MG/1
TABLET, EXTENDED RELEASE ORAL
COMMUNITY
Start: 2018-08-20

## 2023-05-25 RX ORDER — LORATADINE 10 MG/1
CAPSULE, LIQUID FILLED ORAL
COMMUNITY

## 2023-05-25 RX ORDER — SODIUM BICARBONATE 650 MG/1
TABLET ORAL 4 TIMES DAILY
COMMUNITY

## 2023-05-25 RX ORDER — PRAVASTATIN SODIUM 20 MG
1 TABLET ORAL NIGHTLY
COMMUNITY
Start: 2019-12-23